# Patient Record
Sex: FEMALE | Race: WHITE | Employment: FULL TIME | URBAN - METROPOLITAN AREA
[De-identification: names, ages, dates, MRNs, and addresses within clinical notes are randomized per-mention and may not be internally consistent; named-entity substitution may affect disease eponyms.]

---

## 2018-10-23 ENCOUNTER — HOSPITAL ENCOUNTER (EMERGENCY)
Age: 28
Discharge: HOME | End: 2018-10-23
Attending: EMERGENCY MEDICINE
Payer: MEDICAID

## 2018-10-23 VITALS
HEART RATE: 83 BPM | BODY MASS INDEX: 31.14 KG/M2 | OXYGEN SATURATION: 100 % | DIASTOLIC BLOOD PRESSURE: 78 MMHG | TEMPERATURE: 98.4 F | RESPIRATION RATE: 14 BRPM | WEIGHT: 182.4 LBS | HEIGHT: 64 IN | SYSTOLIC BLOOD PRESSURE: 132 MMHG

## 2018-10-23 DIAGNOSIS — R11.2 NON-INTRACTABLE VOMITING WITH NAUSEA, UNSPECIFIED VOMITING TYPE: Primary | ICD-10-CM

## 2018-10-23 LAB
BILIRUBIN URINE: NEGATIVE
BLOOD, URINE: NEGATIVE
CLARITY: CLEAR
COLOR: YELLOW
GLUCOSE URINE: NEGATIVE MG/DL
HCG(URINE) PREGNANCY TEST: NEGATIVE
KETONES, URINE: NEGATIVE MG/DL
LEUKOCYTE ESTERASE, URINE: NEGATIVE
MICROSCOPIC EXAMINATION: NORMAL
NITRITE, URINE: NEGATIVE
PH UA: 7
PROTEIN UA: NEGATIVE MG/DL
SPECIFIC GRAVITY UA: 1.02
URINE REFLEX TO CULTURE: NORMAL
URINE TYPE: NORMAL
UROBILINOGEN, URINE: 0.2 E.U./DL

## 2018-10-23 PROCEDURE — 6360000002 HC RX W HCPCS: Performed by: EMERGENCY MEDICINE

## 2018-10-23 PROCEDURE — 81003 URINALYSIS AUTO W/O SCOPE: CPT

## 2018-10-23 PROCEDURE — 84703 CHORIONIC GONADOTROPIN ASSAY: CPT

## 2018-10-23 PROCEDURE — 99283 EMERGENCY DEPT VISIT LOW MDM: CPT

## 2018-10-23 RX ORDER — ONDANSETRON 4 MG/1
4 TABLET, FILM COATED ORAL EVERY 8 HOURS PRN
Qty: 20 TABLET | Refills: 0 | Status: SHIPPED | OUTPATIENT
Start: 2018-10-23 | End: 2019-02-19

## 2018-10-23 RX ORDER — DICYCLOMINE HYDROCHLORIDE 10 MG/1
10 CAPSULE ORAL
Qty: 30 CAPSULE | Refills: 0 | Status: SHIPPED | OUTPATIENT
Start: 2018-10-23 | End: 2019-02-19

## 2018-10-23 RX ORDER — DICYCLOMINE HYDROCHLORIDE 10 MG/1
10 CAPSULE ORAL ONCE
Status: DISCONTINUED | OUTPATIENT
Start: 2018-10-23 | End: 2018-10-23 | Stop reason: HOSPADM

## 2018-10-23 RX ORDER — ONDANSETRON 4 MG/1
4 TABLET, ORALLY DISINTEGRATING ORAL ONCE
Status: COMPLETED | OUTPATIENT
Start: 2018-10-23 | End: 2018-10-23

## 2018-10-23 RX ADMIN — ONDANSETRON 4 MG: 4 TABLET, ORALLY DISINTEGRATING ORAL at 20:42

## 2019-02-19 ENCOUNTER — HOSPITAL ENCOUNTER (EMERGENCY)
Age: 29
Discharge: HOME | End: 2019-02-19
Attending: EMERGENCY MEDICINE
Payer: MEDICAID

## 2019-02-19 VITALS
DIASTOLIC BLOOD PRESSURE: 92 MMHG | SYSTOLIC BLOOD PRESSURE: 145 MMHG | RESPIRATION RATE: 20 BRPM | WEIGHT: 167.99 LBS | HEART RATE: 100 BPM | OXYGEN SATURATION: 96 % | BODY MASS INDEX: 28.68 KG/M2 | TEMPERATURE: 99.4 F | HEIGHT: 64 IN

## 2019-02-19 DIAGNOSIS — J06.9 UPPER RESPIRATORY TRACT INFECTION, UNSPECIFIED TYPE: Primary | ICD-10-CM

## 2019-02-19 PROCEDURE — 99282 EMERGENCY DEPT VISIT SF MDM: CPT

## 2019-02-19 PROCEDURE — 6370000000 HC RX 637 (ALT 250 FOR IP): Performed by: EMERGENCY MEDICINE

## 2019-02-19 RX ORDER — PSEUDOEPHEDRINE HYDROCHLORIDE 30 MG/1
30 TABLET ORAL EVERY 4 HOURS PRN
Status: DISCONTINUED | OUTPATIENT
Start: 2019-02-19 | End: 2019-02-20 | Stop reason: HOSPADM

## 2019-02-19 RX ORDER — CLINDAMYCIN HYDROCHLORIDE 300 MG/1
300 CAPSULE ORAL
COMMUNITY
Start: 2019-01-13 | End: 2019-03-07

## 2019-02-19 RX ORDER — ECHINACEA PURPUREA EXTRACT 125 MG
2 TABLET ORAL PRN
Qty: 1 BOTTLE | Refills: 3 | Status: SHIPPED | OUTPATIENT
Start: 2019-02-19 | End: 2020-01-12 | Stop reason: ALTCHOICE

## 2019-02-19 RX ADMIN — PSEUDOEPHEDRINE HCL 30 MG: 30 TABLET, FILM COATED ORAL at 23:34

## 2019-02-19 ASSESSMENT — PAIN SCALES - GENERAL
PAINLEVEL_OUTOF10: 9
PAINLEVEL_OUTOF10: 9

## 2019-02-19 ASSESSMENT — PAIN - FUNCTIONAL ASSESSMENT: PAIN_FUNCTIONAL_ASSESSMENT: 0-10

## 2019-03-07 ENCOUNTER — HOSPITAL ENCOUNTER (EMERGENCY)
Age: 29
Discharge: HOME | End: 2019-03-07
Attending: EMERGENCY MEDICINE
Payer: MEDICAID

## 2019-03-07 ENCOUNTER — APPOINTMENT (OUTPATIENT)
Dept: GENERAL RADIOLOGY | Age: 29
End: 2019-03-07
Payer: MEDICAID

## 2019-03-07 VITALS
DIASTOLIC BLOOD PRESSURE: 79 MMHG | WEIGHT: 165.57 LBS | BODY MASS INDEX: 28.42 KG/M2 | TEMPERATURE: 98.3 F | SYSTOLIC BLOOD PRESSURE: 139 MMHG | OXYGEN SATURATION: 97 % | RESPIRATION RATE: 16 BRPM | HEART RATE: 90 BPM

## 2019-03-07 DIAGNOSIS — J02.0 STREP THROAT: Primary | ICD-10-CM

## 2019-03-07 DIAGNOSIS — J02.9 ACUTE PHARYNGITIS, UNSPECIFIED ETIOLOGY: ICD-10-CM

## 2019-03-07 DIAGNOSIS — J06.9 UPPER RESPIRATORY TRACT INFECTION, UNSPECIFIED TYPE: ICD-10-CM

## 2019-03-07 DIAGNOSIS — R05.9 COUGH: ICD-10-CM

## 2019-03-07 DIAGNOSIS — Z72.0 TOBACCO ABUSE: ICD-10-CM

## 2019-03-07 LAB
RAPID INFLUENZA  B AGN: NEGATIVE
RAPID INFLUENZA A AGN: NEGATIVE
S PYO AG THROAT QL: POSITIVE

## 2019-03-07 PROCEDURE — 6370000000 HC RX 637 (ALT 250 FOR IP): Performed by: NURSE PRACTITIONER

## 2019-03-07 PROCEDURE — 71046 X-RAY EXAM CHEST 2 VIEWS: CPT

## 2019-03-07 PROCEDURE — 99283 EMERGENCY DEPT VISIT LOW MDM: CPT

## 2019-03-07 PROCEDURE — 87804 INFLUENZA ASSAY W/OPTIC: CPT

## 2019-03-07 PROCEDURE — 87880 STREP A ASSAY W/OPTIC: CPT

## 2019-03-07 RX ORDER — AZITHROMYCIN 500 MG/1
500 TABLET, FILM COATED ORAL ONCE
Status: COMPLETED | OUTPATIENT
Start: 2019-03-07 | End: 2019-03-07

## 2019-03-07 RX ORDER — ACETAMINOPHEN 500 MG
1000 TABLET ORAL EVERY 6 HOURS PRN
Qty: 30 TABLET | Refills: 0 | Status: SHIPPED | OUTPATIENT
Start: 2019-03-07 | End: 2020-01-12 | Stop reason: ALTCHOICE

## 2019-03-07 RX ORDER — BENZONATATE 100 MG/1
100 CAPSULE ORAL 3 TIMES DAILY PRN
Qty: 20 CAPSULE | Refills: 0 | Status: SHIPPED | OUTPATIENT
Start: 2019-03-07 | End: 2020-01-12 | Stop reason: ALTCHOICE

## 2019-03-07 RX ORDER — FLUTICASONE PROPIONATE 50 MCG
1 SPRAY, SUSPENSION (ML) NASAL DAILY
Qty: 1 BOTTLE | Refills: 0 | Status: SHIPPED | OUTPATIENT
Start: 2019-03-07 | End: 2020-01-12 | Stop reason: ALTCHOICE

## 2019-03-07 RX ORDER — AZITHROMYCIN 500 MG/1
500 TABLET, FILM COATED ORAL DAILY
Qty: 5 TABLET | Refills: 0 | Status: SHIPPED | OUTPATIENT
Start: 2019-03-07 | End: 2019-03-12

## 2019-03-07 RX ADMIN — AZITHROMYCIN 500 MG: 500 TABLET, FILM COATED ORAL at 20:20

## 2019-03-07 ASSESSMENT — ENCOUNTER SYMPTOMS
TROUBLE SWALLOWING: 0
RESPIRATORY NEGATIVE: 1
SHORTNESS OF BREATH: 0
SORE THROAT: 1
VOICE CHANGE: 0
GASTROINTESTINAL NEGATIVE: 1
FACIAL SWELLING: 0

## 2019-03-07 ASSESSMENT — PAIN SCALES - GENERAL
PAINLEVEL_OUTOF10: 6

## 2019-03-26 LAB
BASOPHILS ABSOLUTE: 0 K/UL (ref 0–0.2)
BASOPHILS RELATIVE PERCENT: 0.4 %
EOSINOPHILS ABSOLUTE: 0.3 K/UL (ref 0–0.6)
EOSINOPHILS RELATIVE PERCENT: 2.5 %
FOLLICLE STIMULATING HORMONE: 5.9 MIU/ML
HCT VFR BLD CALC: 40.8 % (ref 36–48)
HEMOGLOBIN: 13.4 G/DL (ref 12–16)
LYMPHOCYTES ABSOLUTE: 2 K/UL (ref 1–5.1)
LYMPHOCYTES RELATIVE PERCENT: 16.1 %
MCH RBC QN AUTO: 29.5 PG (ref 26–34)
MCHC RBC AUTO-ENTMCNC: 32.8 G/DL (ref 31–36)
MCV RBC AUTO: 90.2 FL (ref 80–100)
MONOCYTES ABSOLUTE: 0.3 K/UL (ref 0–1.3)
MONOCYTES RELATIVE PERCENT: 2.7 %
NEUTROPHILS ABSOLUTE: 9.6 K/UL (ref 1.7–7.7)
NEUTROPHILS RELATIVE PERCENT: 78.3 %
PDW BLD-RTO: 14.5 % (ref 12.4–15.4)
PLATELET # BLD: 215 K/UL (ref 135–450)
PMV BLD AUTO: 9.9 FL (ref 5–10.5)
RBC # BLD: 4.53 M/UL (ref 4–5.2)
TSH SERPL DL<=0.05 MIU/L-ACNC: 0.75 UIU/ML (ref 0.27–4.2)
WBC # BLD: 12.3 K/UL (ref 4–11)

## 2020-01-12 ENCOUNTER — HOSPITAL ENCOUNTER (EMERGENCY)
Age: 30
Discharge: HOME | End: 2020-01-13
Attending: EMERGENCY MEDICINE

## 2020-01-12 LAB
A/G RATIO: 1.4 (ref 1.1–2.2)
ALBUMIN SERPL-MCNC: 4.1 G/DL (ref 3.4–5)
ALP BLD-CCNC: 72 U/L (ref 40–129)
ALT SERPL-CCNC: 14 U/L (ref 10–40)
AMORPHOUS: ABNORMAL /HPF
ANION GAP SERPL CALCULATED.3IONS-SCNC: 9 MMOL/L (ref 3–16)
AST SERPL-CCNC: 14 U/L (ref 15–37)
BACTERIA: ABNORMAL /HPF
BASOPHILS ABSOLUTE: 0 K/UL (ref 0–0.2)
BASOPHILS RELATIVE PERCENT: 0.6 %
BILIRUB SERPL-MCNC: <0.2 MG/DL (ref 0–1)
BILIRUBIN URINE: NEGATIVE
BLOOD, URINE: NEGATIVE
BUN BLDV-MCNC: 9 MG/DL (ref 7–20)
CALCIUM SERPL-MCNC: 9.8 MG/DL (ref 8.3–10.6)
CHLORIDE BLD-SCNC: 105 MMOL/L (ref 99–110)
CLARITY: ABNORMAL
CO2: 27 MMOL/L (ref 21–32)
COLOR: YELLOW
CREAT SERPL-MCNC: 0.6 MG/DL (ref 0.6–1.1)
EOSINOPHILS ABSOLUTE: 0.5 K/UL (ref 0–0.6)
EOSINOPHILS RELATIVE PERCENT: 6.5 %
EPITHELIAL CELLS, UA: ABNORMAL /HPF
GFR AFRICAN AMERICAN: >60
GFR NON-AFRICAN AMERICAN: >60
GLOBULIN: 2.9 G/DL
GLUCOSE BLD-MCNC: 82 MG/DL (ref 70–99)
GLUCOSE URINE: NEGATIVE MG/DL
HCG(URINE) PREGNANCY TEST: NEGATIVE
HCT VFR BLD CALC: 38.2 % (ref 36–48)
HEMOGLOBIN: 12.8 G/DL (ref 12–16)
KETONES, URINE: ABNORMAL MG/DL
LEUKOCYTE ESTERASE, URINE: ABNORMAL
LYMPHOCYTES ABSOLUTE: 2.4 K/UL (ref 1–5.1)
LYMPHOCYTES RELATIVE PERCENT: 29.9 %
MCH RBC QN AUTO: 31.5 PG (ref 26–34)
MCHC RBC AUTO-ENTMCNC: 33.6 G/DL (ref 31–36)
MCV RBC AUTO: 93.8 FL (ref 80–100)
MICROSCOPIC EXAMINATION: YES
MONOCYTES ABSOLUTE: 0.5 K/UL (ref 0–1.3)
MONOCYTES RELATIVE PERCENT: 5.9 %
NEUTROPHILS ABSOLUTE: 4.7 K/UL (ref 1.7–7.7)
NEUTROPHILS RELATIVE PERCENT: 57.1 %
NITRITE, URINE: NEGATIVE
PDW BLD-RTO: 12.9 % (ref 12.4–15.4)
PH UA: 7 (ref 5–8)
PLATELET # BLD: 160 K/UL (ref 135–450)
PMV BLD AUTO: 9.8 FL (ref 5–10.5)
POTASSIUM SERPL-SCNC: 3.9 MMOL/L (ref 3.5–5.1)
PROTEIN UA: NEGATIVE MG/DL
RBC # BLD: 4.08 M/UL (ref 4–5.2)
RBC UA: ABNORMAL /HPF (ref 0–2)
SODIUM BLD-SCNC: 141 MMOL/L (ref 136–145)
SPECIFIC GRAVITY UA: 1.02 (ref 1–1.03)
TOTAL PROTEIN: 7 G/DL (ref 6.4–8.2)
URINE REFLEX TO CULTURE: YES
URINE TYPE: ABNORMAL
UROBILINOGEN, URINE: 0.2 E.U./DL
WBC # BLD: 8.1 K/UL (ref 4–11)
WBC UA: ABNORMAL /HPF (ref 0–5)

## 2020-01-12 PROCEDURE — 36415 COLL VENOUS BLD VENIPUNCTURE: CPT

## 2020-01-12 PROCEDURE — 85025 COMPLETE CBC W/AUTO DIFF WBC: CPT

## 2020-01-12 PROCEDURE — 81001 URINALYSIS AUTO W/SCOPE: CPT

## 2020-01-12 PROCEDURE — 96374 THER/PROPH/DIAG INJ IV PUSH: CPT

## 2020-01-12 PROCEDURE — 99284 EMERGENCY DEPT VISIT MOD MDM: CPT

## 2020-01-12 PROCEDURE — 84703 CHORIONIC GONADOTROPIN ASSAY: CPT

## 2020-01-12 PROCEDURE — 80053 COMPREHEN METABOLIC PANEL: CPT

## 2020-01-12 PROCEDURE — 87086 URINE CULTURE/COLONY COUNT: CPT

## 2020-01-12 PROCEDURE — 6360000002 HC RX W HCPCS: Performed by: EMERGENCY MEDICINE

## 2020-01-12 PROCEDURE — 96361 HYDRATE IV INFUSION ADD-ON: CPT

## 2020-01-12 PROCEDURE — 2580000003 HC RX 258: Performed by: EMERGENCY MEDICINE

## 2020-01-12 RX ORDER — ONDANSETRON 2 MG/ML
4 INJECTION INTRAMUSCULAR; INTRAVENOUS ONCE
Status: COMPLETED | OUTPATIENT
Start: 2020-01-12 | End: 2020-01-12

## 2020-01-12 RX ORDER — 0.9 % SODIUM CHLORIDE 0.9 %
1000 INTRAVENOUS SOLUTION INTRAVENOUS ONCE
Status: COMPLETED | OUTPATIENT
Start: 2020-01-12 | End: 2020-01-13

## 2020-01-12 RX ADMIN — SODIUM CHLORIDE 1000 ML: 9 INJECTION, SOLUTION INTRAVENOUS at 23:35

## 2020-01-12 RX ADMIN — ONDANSETRON 4 MG: 2 INJECTION INTRAMUSCULAR; INTRAVENOUS at 23:35

## 2020-01-12 ASSESSMENT — PAIN DESCRIPTION - PAIN TYPE: TYPE: ACUTE PAIN

## 2020-01-12 ASSESSMENT — PAIN DESCRIPTION - LOCATION: LOCATION: ABDOMEN

## 2020-01-12 ASSESSMENT — PAIN SCALES - GENERAL: PAINLEVEL_OUTOF10: 5

## 2020-01-12 ASSESSMENT — PAIN DESCRIPTION - DESCRIPTORS: DESCRIPTORS: ACHING

## 2020-01-13 VITALS
SYSTOLIC BLOOD PRESSURE: 120 MMHG | HEART RATE: 74 BPM | TEMPERATURE: 98.3 F | WEIGHT: 150.13 LBS | OXYGEN SATURATION: 99 % | DIASTOLIC BLOOD PRESSURE: 74 MMHG | BODY MASS INDEX: 25.77 KG/M2 | RESPIRATION RATE: 16 BRPM

## 2020-01-13 RX ORDER — ONDANSETRON 4 MG/1
4 TABLET, ORALLY DISINTEGRATING ORAL EVERY 8 HOURS PRN
Qty: 10 TABLET | Refills: 0 | Status: SHIPPED | OUTPATIENT
Start: 2020-01-13 | End: 2020-02-19

## 2020-01-13 NOTE — ED PROVIDER NOTES
Triage Chief Complaint:   Emesis (started this am, \"tried weed to help with the nausea and that didn't help, usually does\" ) and Diarrhea (4 days ago, now [place] and feel constipated\" )      [place]:  Zhen Clarke is a 34 y.o. female that presents with nausea vomiting and diarrhea. Patient did not have a fever and has not passed blood in her vomitus or stool. She denies urinary symptoms and does not believe herself to be pregnant. Symptoms have been ongoing for the past 4 days and she does have a burning sensation in her epigastrium. She has had a  as her only abdominal surgery. Patient is generally healthy with no active medications at this time. ROS:  · Constitutional: No Fever or Weight Loss  · Eyes: No Decreased Vision  · ENT: No stridor, sore throat, congestion,    · Cardiovascular: No chest pain or palpitations   · Respiratory: No shortness of breath, cough, or wheezing  · Gastrointestinal: Positive abdominal pain, nausea, vomiting, and diarrhea  · Genitourinary: No dysuria, or flank pain  · Musculoskeletal:  No  weakness, no muscle or joint pain  · Integumentary: No rash, bruising, or pruritis      Past Medical History:   Diagnosis Date    Arthritis dx 2010    Arthritis     Chlamydia     chlamydia or gonorrhea at 14years old.  Depression     Eczema     Heart murmur     History of chlamydia     pt states she had either chlamydia or gonorrhea but not sure which one.  She was treated on antibiotics    History of gonorrhea     pt states she had either gonorrhea or chlamydia  and was treated on antibiotics    Latex allergy dx     allergy to latex condoms    Miscarriage     Seasonal allergies dx     Takes sudafed prn    Staph infection     Upper respiratory infection 2012    was on antibiotics and inhalor     Past Surgical History:   Procedure Laterality Date     SECTION       Family History   Problem Relation Age of Onset    Emphysema Mother    [facility] Other Mother arthritis/endometreosis    Rashes/Skin Problems Mother         eczema    High Cholesterol Mother     Stroke Mother     Diabetes Maternal Grandmother     Thyroid Disease Maternal Grandmother     Heart Disease Maternal Grandmother     Arthritis Maternal Grandmother     Asthma Maternal Grandmother      Social History     Socioeconomic History    Marital status: Single     Spouse name: Not on file    Number of children: Not on file    Years of education: Not on file    Highest education level: Not on file   Occupational History    Not on file   Social Needs    Financial resource strain: Not on file    Food insecurity:     Worry: Not on file     Inability: Not on file    Transportation needs:     Medical: Not on file     Non-medical: Not on file   Tobacco Use    Smoking status: Current Every Day Smoker     Packs/day: 0.50     Years: 10.00     Pack years: 5.00     Types: Cigarettes    Smokeless tobacco: Never Used    Tobacco comment: .     Substance and Sexual Activity    Alcohol use: Yes     Comment: occasional    Drug use: Yes     Frequency: 5.0 times per week     Types: Marijuana     Comment: occas    Sexual activity: Yes     Partners: Male   Lifestyle    Physical activity:     Days per week: Not on file     Minutes per session: Not on file    Stress: Not on file   Relationships    Social connections:     Talks on phone: Not on file     Gets together: Not on file     Attends Worship service: Not on file     Active member of club or organization: Not on file     Attends meetings of clubs or organizations: Not on file     Relationship status: Not on file    Intimate partner violence:     Fear of current or ex partner: Not on file     Emotionally abused: Not on file     Physically abused: Not on file     Forced sexual activity: Not on file   Other Topics Concern    Not on file   Social History Narrative    Not on file     Current Facility-Administered Medications   Medication Dose interpreted all of the currently available lab results from this visit (if applicable):  Results for orders placed or performed during the hospital encounter of 01/12/20   Urinalysis Reflex to Culture   Result Value Ref Range    Color, UA Yellow Straw/Yellow    Clarity, UA SL CLOUDY (A) Clear    Glucose, Ur Negative Negative mg/dL    Bilirubin Urine Negative Negative    Ketones, Urine TRACE (A) Negative mg/dL    Specific Gravity, UA 1.020 1.005 - 1.030    Blood, Urine Negative Negative    pH, UA 7.0 5.0 - 8.0    Protein, UA Negative Negative mg/dL    Urobilinogen, Urine 0.2 <2.0 E.U./dL    Nitrite, Urine Negative Negative    Leukocyte Esterase, Urine TRACE (A) Negative    Microscopic Examination YES     Urine Type NotGiven     Urine Reflex to Culture Yes    Pregnancy, Urine   Result Value Ref Range    HCG(Urine) Pregnancy Test Negative Detects HCG level >20 MIU/mL   Microscopic Urinalysis   Result Value Ref Range    WBC, UA 3-5 0 - 5 /HPF    RBC, UA 0-2 0 - 2 /HPF    Epi Cells 20-50 /HPF    Bacteria, UA Rare (A) /HPF    Amorphous, UA 1+ (A) /HPF   CBC Auto Differential   Result Value Ref Range    WBC 8.1 4.0 - 11.0 K/uL    RBC 4.08 4.00 - 5.20 M/uL    Hemoglobin 12.8 12.0 - 16.0 g/dL    Hematocrit 38.2 36.0 - 48.0 %    MCV 93.8 80.0 - 100.0 fL    MCH 31.5 26.0 - 34.0 pg    MCHC 33.6 31.0 - 36.0 g/dL    RDW 12.9 12.4 - 15.4 %    Platelets 764 476 - 472 K/uL    MPV 9.8 5.0 - 10.5 fL    Neutrophils % 57.1 %    Lymphocytes % 29.9 %    Monocytes % 5.9 %    Eosinophils % 6.5 %    Basophils % 0.6 %    Neutrophils Absolute 4.7 1.7 - 7.7 K/uL    Lymphocytes Absolute 2.4 1.0 - 5.1 K/uL    Monocytes Absolute 0.5 0.0 - 1.3 K/uL    Eosinophils Absolute 0.5 0.0 - 0.6 K/uL    Basophils Absolute 0.0 0.0 - 0.2 K/uL   Comprehensive Metabolic Panel   Result Value Ref Range    Sodium 141 136 - 145 mmol/L    Potassium 3.9 3.5 - 5.1 mmol/L    Chloride 105 99 - 110 mmol/L    CO2 27 21 - 32 mmol/L    Anion Gap 9 3 - 16    Glucose 82 70 - 99 mg/dL    BUN 9 7 - 20 mg/dL    CREATININE 0.6 0.6 - 1.1 mg/dL    GFR Non-African American >60 >60    GFR African American >60 >60    Calcium 9.8 8.3 - 10.6 mg/dL    Total Protein 7.0 6.4 - 8.2 g/dL    Alb 4.1 3.4 - 5.0 g/dL    Albumin/Globulin Ratio 1.4 1.1 - 2.2    Total Bilirubin <0.2 0.0 - 1.0 mg/dL    Alkaline Phosphatase 72 40 - 129 U/L    ALT 14 10 - 40 U/L    AST 14 (L) 15 - 37 U/L    Globulin 2.9 g/dL        Radiographs (if obtained):  [] Radiologist's Report Reviewed:  No orders to display       [] Discussed with Radiologist:     [] The following radiograph was interpreted by myself in the absence of a radiologist:     EKG (if obtained): (All EKG's are interpreted by myself in the absence of a cardiologist)      MDM:   Patient with nausea vomiting and diarrhea presents to the ER for evaluation. Her labs were not diagnostically abnormal her symptoms improved and she is now eating potato chips in the emergency department. She will be treated as an outpatient I have given her Zofran for nausea and vomiting and recommended probiotic yogurt for diarrhea. In the room if problems develop as evidenced by persistent vomiting fever  worsening pain or other concerning symptoms she is to return to the emergency department for further evaluation. Patient understands these instructions    Final Impression:  1. Nausea vomiting and diarrhea        Critical Care:       Disposition referral (if applicable):  [facility]  [phone]  Call in 1 day  For wound re-check      Disposition medications (if applicable):  New Prescriptions    ONDANSETRON (ZOFRAN ODT) 4 MG DISINTEGRATING TABLET    Take 1 tablet by mouth every 8 hours as needed for Nausea or Vomiting       Comment: Please note this report has been produced using speech recognition software and may contain errors related to that system including errors in grammar, punctuation, and spelling, as well as words and phrases that may be inappropriate.  If there are any questions or concerns please feel free to contact the dictating provider for clarification.       (Please note that portions of this note may have been completed with a voice recognition program. Efforts were made to edit the dictations but occasionally words are mis-transcribed.)    MD Alex Matos., MD  01/13/20 8613

## 2020-01-13 NOTE — ED NOTES
To room to give warm blanket, patient seen eating salt and pickle chips, states, \"always makes me feel better but not today\"     Patria Juarez, RN  01/13/20 9548

## 2020-01-13 NOTE — ED NOTES
Reviewed instructions with patient and family, both verb under, discharged home, accompanied by family     Brooke Kessler RN  01/13/20 6914

## 2020-01-14 LAB — URINE CULTURE, ROUTINE: NORMAL

## 2020-02-19 ENCOUNTER — HOSPITAL ENCOUNTER (EMERGENCY)
Age: 30
Discharge: HOME | End: 2020-02-19
Attending: EMERGENCY MEDICINE

## 2020-02-19 VITALS
BODY MASS INDEX: 24.69 KG/M2 | HEIGHT: 64 IN | WEIGHT: 144.62 LBS | DIASTOLIC BLOOD PRESSURE: 61 MMHG | OXYGEN SATURATION: 99 % | TEMPERATURE: 98.6 F | RESPIRATION RATE: 16 BRPM | SYSTOLIC BLOOD PRESSURE: 126 MMHG | HEART RATE: 86 BPM

## 2020-02-19 PROCEDURE — 99283 EMERGENCY DEPT VISIT LOW MDM: CPT

## 2020-02-19 RX ORDER — CLINDAMYCIN HYDROCHLORIDE 150 MG/1
150 CAPSULE ORAL 4 TIMES DAILY
Qty: 40 CAPSULE | Refills: 0 | Status: SHIPPED | OUTPATIENT
Start: 2020-02-19 | End: 2020-02-29

## 2020-02-19 RX ORDER — AMOXICILLIN AND CLAVULANATE POTASSIUM 875; 125 MG/1; MG/1
1 TABLET, FILM COATED ORAL 2 TIMES DAILY
Qty: 20 TABLET | Refills: 0 | Status: SHIPPED | OUTPATIENT
Start: 2020-02-19 | End: 2020-02-29

## 2020-02-19 RX ORDER — CLINDAMYCIN HYDROCHLORIDE 300 MG/1
300 CAPSULE ORAL 3 TIMES DAILY
Qty: 30 CAPSULE | Refills: 0 | Status: SHIPPED | OUTPATIENT
Start: 2020-02-19 | End: 2020-02-19 | Stop reason: SDUPTHER

## 2020-02-19 ASSESSMENT — PAIN DESCRIPTION - DESCRIPTORS: DESCRIPTORS: ACHING;SORE

## 2020-02-19 ASSESSMENT — ENCOUNTER SYMPTOMS
EYE DISCHARGE: 0
ABDOMINAL PAIN: 0
CHOKING: 0
VOICE CHANGE: 0
SORE THROAT: 1
SHORTNESS OF BREATH: 0
APNEA: 0
EYE ITCHING: 0
COUGH: 0
TROUBLE SWALLOWING: 0
CHEST TIGHTNESS: 0

## 2020-02-19 ASSESSMENT — PAIN SCALES - GENERAL
PAINLEVEL_OUTOF10: 10
PAINLEVEL_OUTOF10: 10

## 2020-02-19 ASSESSMENT — PAIN - FUNCTIONAL ASSESSMENT: PAIN_FUNCTIONAL_ASSESSMENT: 0-10

## 2020-02-19 ASSESSMENT — PAIN DESCRIPTION - LOCATION: LOCATION: THROAT

## 2020-02-19 ASSESSMENT — PAIN DESCRIPTION - PAIN TYPE: TYPE: ACUTE PAIN

## 2020-02-20 NOTE — ED PROVIDER NOTES
[address] [facility]  eMERGENCY dEPARTMENT eNCOUnter      Pt Name: Aayush Cota  MRN: [id number]  [place] 1990  Date of evaluation: 2/19/2020  Provider: Ernesto Cuellar MD    CHIEF COMPLAINT       Multiple complaints     HISTORY OF PRESENT ILLNESS    Aayush Cota is a 31 y.o. female who presents to the emergency department with multiple complaints. + for sore throat x 2 days. Pain is 9/10 sharp in nature. Hasn't taken anything for pain. Nothing makes symptoms better or worse. Denies GERALD or SOB. + for left ear pain. Times one day. After using q tip pain occurred. Pain is 9/10 sharp in nature. Hasn't taken anything for pain. Nothing makes symptoms better or worse. Left calf \"abscess. \" States she popped it 24 hours pta. + for redness now. Pain is 9/10 sharp in nature. Hasn't taken anything for pain. Nothing makes symptoms better or worse. No other associated symptoms. Hx MRSA. Nursing Notes were reviewed. REVIEW OF SYSTEMS       Review of Systems   Constitutional: Negative for activity change, appetite change, diaphoresis, fatigue and fever. HENT: Positive for ear pain and sore throat. Negative for tinnitus, trouble swallowing and voice change. Eyes: Negative for discharge and itching. Respiratory: Negative for apnea, cough, choking, chest tightness and shortness of breath. Cardiovascular: Negative for chest pain and leg swelling. Gastrointestinal: Negative for abdominal pain. Genitourinary: Negative for difficulty urinating. Musculoskeletal: Negative for arthralgias. Neurological: Negative for dizziness and facial asymmetry. Psychiatric/Behavioral: Negative for agitation. Except as noted above the remainder of the review of systems was reviewed and negative. PAST MEDICAL HISTORY     Past Medical History:   Diagnosis Date    Arthritis dx 2010    Arthritis     Chlamydia     chlamydia or gonorrhea at 14years old.     Depression     Eczema     Heart murmur     History of chlamydia     pt states she had either chlamydia or gonorrhea but not sure which one. She was treated on antibiotics    History of gonorrhea     pt states she had either gonorrhea or chlamydia  and was treated on antibiotics    Latex allergy dx     allergy to latex condoms    Miscarriage     Seasonal allergies dx     Takes sudafed prn    Staph infection     Upper respiratory infection 2012    was on antibiotics and inhalor       SURGICAL HISTORY       Past Surgical History:   Procedure Laterality Date     SECTION         CURRENT MEDICATIONS       Previous Medications    No medications on file       ALLERGIES     Latex; Vicodin [hydrocodone-acetaminophen]; Flexeril [cyclobenzaprine]; Bactrim; Cephalexin; and Penicillins    FAMILY HISTORY        Family History   Problem Relation Age of Onset   [facility] Emphysema Mother     Other Mother         arthritis/endometreosis    Rashes/Skin Problems Mother         eczema    High Cholesterol Mother     Stroke Mother     Diabetes Maternal Grandmother     Thyroid Disease Maternal Grandmother     Heart Disease Maternal Grandmother     Arthritis Maternal Grandmother     Asthma Maternal Grandmother        SOCIAL HISTORY       Social History     Socioeconomic History    Marital status: Single     Spouse name: Not on file    Number of children: Not on file    Years of education: Not on file    Highest education level: Not on file   Occupational History    Not on file   Social Needs    Financial resource strain: Not on file    Food insecurity:     Worry: Not on file     Inability: Not on file    Transportation needs:     Medical: Not on file     Non-medical: Not on file   Tobacco Use    Smoking status: Current Every Day Smoker     Packs/day: 0.50     Years: 10.00     Pack years: 5.00     Types: Cigarettes    Smokeless tobacco: Never Used    Tobacco comment: . Substance and Sexual Activity    Alcohol use:  Yes Bactrim allergy and Doxy allergy per patient. Vitals reassuring. Tolerating po. No GERALD or SOB. I discussed with patient the results of evaluation in the ED, diagnosis, care, and prognosis. The plan is to discharge to home. Patient is in agreement with plan and questions have been answered.      I also discussed with patient the reasons which may require a return visit and the importance of follow-up care. The patient is well-appearing, nontoxic, and improved at the time of discharge. Patient agrees to call to arrange follow-up care as directed. Patient understands to return immediately for worsening/change in symptoms. CRITICAL CARE TIME   Total Critical Caretime was 0 minutes, excluding separately reportable procedures. There was a high probability of clinically significant/life threatening deterioration in the patient's condition which required my urgent intervention. PROCEDURES:  Unlessotherwise noted below, none    FINAL IMPRESSION      1. Acute pharyngitis, unspecified etiology    2. Cellulitis, unspecified cellulitis site    3.  Perforation of left tympanic membrane          DISPOSITION/PLAN   DISPOSITION Decision To Discharge 02/19/2020 08:37:29 PM    PATIENT REFERRED TO:  Adelina Whyte MD  [address]  [place] [address] [facility]  [phone]            DISCHARGE MEDICATIONS:  New Prescriptions    AMOXICILLIN-CLAVULANATE (AUGMENTIN) 875-125 MG PER TABLET    Take 1 tablet by mouth 2 times daily for 10 days    CLINDAMYCIN (CLEOCIN) 150 MG CAPSULE    Take 1 capsule by mouth 4 times daily for 10 days          (Please note that portions ofthis note were completed with a voice recognition program.  Efforts were made to edit the dictations but occasionally words are mis-transcribed.)    Amina Clinton MD(electronically signed)  Attending Emergency Physician          Amina Clinton MD  02/19/20 5385

## 2020-06-21 ENCOUNTER — APPOINTMENT (OUTPATIENT)
Dept: GENERAL RADIOLOGY | Age: 30
End: 2020-06-21

## 2020-06-21 ENCOUNTER — HOSPITAL ENCOUNTER (EMERGENCY)
Age: 30
Discharge: HOME | End: 2020-06-22
Attending: EMERGENCY MEDICINE

## 2020-06-21 VITALS
HEIGHT: 64 IN | SYSTOLIC BLOOD PRESSURE: 137 MMHG | WEIGHT: 137.13 LBS | BODY MASS INDEX: 23.41 KG/M2 | HEART RATE: 78 BPM | OXYGEN SATURATION: 99 % | TEMPERATURE: 98.5 F | DIASTOLIC BLOOD PRESSURE: 87 MMHG

## 2020-06-21 LAB — HCG(URINE) PREGNANCY TEST: NEGATIVE

## 2020-06-21 PROCEDURE — 84703 CHORIONIC GONADOTROPIN ASSAY: CPT

## 2020-06-21 PROCEDURE — 99284 EMERGENCY DEPT VISIT MOD MDM: CPT

## 2020-06-21 PROCEDURE — 73562 X-RAY EXAM OF KNEE 3: CPT

## 2020-06-21 PROCEDURE — 73502 X-RAY EXAM HIP UNI 2-3 VIEWS: CPT

## 2020-06-21 RX ORDER — IBUPROFEN 200 MG
800 TABLET ORAL EVERY 6 HOURS PRN
COMMUNITY
End: 2021-07-15 | Stop reason: ALTCHOICE

## 2020-06-21 ASSESSMENT — PAIN DESCRIPTION - DESCRIPTORS
DESCRIPTORS: THROBBING;DULL;SHARP
DESCRIPTORS: ACHING;NUMBNESS

## 2020-06-21 ASSESSMENT — PAIN DESCRIPTION - PAIN TYPE
TYPE: ACUTE PAIN
TYPE: ACUTE PAIN

## 2020-06-21 ASSESSMENT — PAIN DESCRIPTION - FREQUENCY: FREQUENCY: CONTINUOUS

## 2020-06-21 ASSESSMENT — PAIN SCALES - GENERAL
PAINLEVEL_OUTOF10: 6
PAINLEVEL_OUTOF10: 6

## 2020-06-21 ASSESSMENT — PAIN DESCRIPTION - LOCATION
LOCATION: KNEE;HIP
LOCATION: KNEE;HIP

## 2020-06-21 ASSESSMENT — PAIN DESCRIPTION - ORIENTATION: ORIENTATION: LEFT;RIGHT

## 2020-06-22 RX ORDER — NAPROXEN 375 MG/1
375 TABLET ORAL 2 TIMES DAILY WITH MEALS
Qty: 60 TABLET | Refills: 0 | Status: SHIPPED | OUTPATIENT
Start: 2020-06-22

## 2020-06-22 ASSESSMENT — PAIN SCALES - GENERAL: PAINLEVEL_OUTOF10: 4

## 2020-06-22 ASSESSMENT — PAIN - FUNCTIONAL ASSESSMENT: PAIN_FUNCTIONAL_ASSESSMENT: 0-10

## 2021-07-15 ENCOUNTER — APPOINTMENT (OUTPATIENT)
Dept: GENERAL RADIOLOGY | Age: 31
End: 2021-07-15
Payer: MEDICAID

## 2021-07-15 ENCOUNTER — HOSPITAL ENCOUNTER (EMERGENCY)
Age: 31
Discharge: HOME | End: 2021-07-15
Payer: MEDICAID

## 2021-07-15 VITALS
WEIGHT: 147.05 LBS | TEMPERATURE: 98 F | HEART RATE: 77 BPM | RESPIRATION RATE: 18 BRPM | DIASTOLIC BLOOD PRESSURE: 48 MMHG | BODY MASS INDEX: 25.1 KG/M2 | HEIGHT: 64 IN | OXYGEN SATURATION: 100 % | SYSTOLIC BLOOD PRESSURE: 118 MMHG

## 2021-07-15 DIAGNOSIS — S62.317A CLOSED DISPLACED FRACTURE OF BASE OF FIFTH METACARPAL BONE OF LEFT HAND, INITIAL ENCOUNTER: Primary | ICD-10-CM

## 2021-07-15 PROCEDURE — 29125 APPL SHORT ARM SPLINT STATIC: CPT

## 2021-07-15 PROCEDURE — 99283 EMERGENCY DEPT VISIT LOW MDM: CPT

## 2021-07-15 PROCEDURE — 6370000000 HC RX 637 (ALT 250 FOR IP): Performed by: PHYSICIAN ASSISTANT

## 2021-07-15 PROCEDURE — 73130 X-RAY EXAM OF HAND: CPT

## 2021-07-15 RX ORDER — OXYCODONE HYDROCHLORIDE AND ACETAMINOPHEN 5; 325 MG/1; MG/1
1 TABLET ORAL EVERY 8 HOURS PRN
Qty: 8 TABLET | Refills: 0 | Status: SHIPPED | OUTPATIENT
Start: 2021-07-15 | End: 2021-07-18

## 2021-07-15 RX ORDER — IBUPROFEN 600 MG/1
600 TABLET ORAL ONCE
Status: COMPLETED | OUTPATIENT
Start: 2021-07-15 | End: 2021-07-15

## 2021-07-15 RX ORDER — IBUPROFEN 600 MG/1
600 TABLET ORAL EVERY 6 HOURS PRN
Qty: 30 TABLET | Refills: 0 | Status: SHIPPED | OUTPATIENT
Start: 2021-07-15 | End: 2022-07-28 | Stop reason: ALTCHOICE

## 2021-07-15 RX ADMIN — IBUPROFEN 600 MG: 600 TABLET, FILM COATED ORAL at 17:00

## 2021-07-15 ASSESSMENT — PAIN DESCRIPTION - LOCATION
LOCATION: HAND
LOCATION: HAND

## 2021-07-15 ASSESSMENT — PAIN SCALES - GENERAL
PAINLEVEL_OUTOF10: 8

## 2021-07-15 ASSESSMENT — ENCOUNTER SYMPTOMS
VOMITING: 0
COLOR CHANGE: 1
NAUSEA: 0

## 2021-07-15 ASSESSMENT — PAIN DESCRIPTION - DESCRIPTORS
DESCRIPTORS: ACHING
DESCRIPTORS: ACHING;TINGLING

## 2021-07-15 ASSESSMENT — PAIN DESCRIPTION - FREQUENCY
FREQUENCY: CONTINUOUS
FREQUENCY: CONTINUOUS

## 2021-07-15 ASSESSMENT — PAIN DESCRIPTION - ORIENTATION
ORIENTATION: LEFT
ORIENTATION: LEFT

## 2021-07-15 ASSESSMENT — PAIN DESCRIPTION - PROGRESSION: CLINICAL_PROGRESSION: GRADUALLY WORSENING

## 2021-07-15 ASSESSMENT — PAIN DESCRIPTION - PAIN TYPE
TYPE: ACUTE PAIN
TYPE: ACUTE PAIN

## 2021-07-15 NOTE — ED PROVIDER NOTES
[address] [facility] [address]  Dept: [phone]  Loc: [phone]  eMERGENCYdEPARTMENT eNCOUnter      Pt Name: Nelson Sol  MRN: [id number]  [place] 1990  Date of evaluation: 7/15/2021  Provider:Spring Cummings PA-C    CHIEF COMPLAINT       Chief Complaint   Patient presents with    Hand Injury     punched wall yesterday       CRITICAL CARE TIME   Total Critical Care time was 0 minutes, excluding separately reportable procedures. There was a high probability of clinically significant/life threatening deterioration in the patient's condition which required my urgentintervention. HISTORY OF PRESENT ILLNESS  (Location/Symptom, Timing/Onset, Context/Setting, Quality, Duration,Modifying Factors, Severity.)   Nelson Sol is a 36 y.o. female who presents to the emergency department by private vehicle complaining of left hand injury. Patient is concerned she sustained a boxer's fracture to her left hand after punching her back door yesterday. She punched her back door after she believed she was going to lose her puppies to parvo. Patient has some numbness in her fingertips, however can feel when she touches her hand. She has pain rated 8/10 to hand. All pain worsened with movement. She is left-hand dominant. Nursing Notes were reviewedand agreed with or any disagreements were addressed in the HPI. REVIEW OF SYSTEMS    (2-9 systems for level 4, 10 or more for level 5)     Review of Systems   Constitutional: Negative for chills and fever. HENT: Negative. Gastrointestinal: Negative for nausea and vomiting. Genitourinary: Negative. Musculoskeletal: Positive for arthralgias. Skin: Positive for color change. Negative for wound. Psychiatric/Behavioral: Negative for behavioral problems and confusion. Except as noted above the remainder of the review of systems was reviewed and negative.        PAST MEDICAL HISTORY         Diagnosis Date    Arthritis dx 2010    Arthritis     Chlamydia     chlamydia or gonorrhea at 14years old.  Depression     Eczema     Heart murmur     History of chlamydia     pt states she had either chlamydia or gonorrhea but not sure which one. She was treated on antibiotics    History of gonorrhea     pt states she had either gonorrhea or chlamydia  and was treated on antibiotics    Latex allergy dx     allergy to latex condoms    Miscarriage     Seasonal allergies dx     Takes sudafed prn    Staph infection     Upper respiratory infection 2012    was on antibiotics and inhalor       SURGICAL HISTORY           Procedure Laterality Date     SECTION         CURRENT MEDICATIONS     [unfilled]    ALLERGIES     Latex, Vicodin [hydrocodone-acetaminophen], Flexeril [cyclobenzaprine], Bactrim, Cephalexin, and Penicillins    FAMILY HISTORY           Problem Relation Age of Onset    Emphysema Mother     Other Mother         arthritis/endometreosis    Rashes/Skin Problems Mother         eczema    High Cholesterol Mother     Stroke Mother     Diabetes Maternal Grandmother     Thyroid Disease Maternal Grandmother     Heart Disease Maternal Grandmother     Arthritis Maternal Grandmother     Asthma Maternal Grandmother      Family Status   Relation Name Status    Mother  (Not Specified)    MGM  (Not Specified)        SOCIAL HISTORY      reports that she has been smoking cigarettes. She has a 5.00 pack-year smoking history. She has never used smokeless tobacco. She reports current alcohol use. She reports current drug use. Frequency: 5.00 times per week. Drug: Marijuana.     PHYSICAL EXAM    (up to 7 for level 4, 8 or more for level 5)     ED Triage Vitals [07/15/21 1541]   Enc Vitals Group      BP (!) 118/48      Pulse 77      Resp 18      Temp 98 °F (36.7 °C)      Temp Source Oral      SpO2 100 %      Weight 147 lb 0.8 oz (66.7 kg)      Height 5' 4\" (1.626 m)      Head Circumference       Peak Flow       Pain Score       Pain Loc       Pain Edu? Excl. in [address]? Physical Exam  Vitals reviewed. Constitutional:       Appearance: Normal appearance. HENT:      Head: Normocephalic and atraumatic. Pulmonary:      Effort: Pulmonary effort is normal. No respiratory distress. Musculoskeletal:      Comments: LUE: Generalized edema and ecchymosis to dorsal aspect of left hand overlying proximal portion of metacarpals 3 through 5 and into carpals. Generous tenderness palpation throughout this region. Minimal movement of digits 2-4 secondary to pain. Sensation present in fingertips, cap refill less than 3 seconds. Radial pulse +2. Skin:     General: Skin is warm. Neurological:      General: No focal deficit present. Mental Status: She is alert and oriented to person, place, and time. Psychiatric:         Mood and Affect: Mood normal.         Behavior: Behavior normal.           DIAGNOSTIC RESULTS     EKG: All EKG's are interpreted by the Emergency Department Physician who either signs or Co-signs this chart in the absence of a cardiologist.    RADIOLOGY:   Non-plain film images such as CT, Ultrasound and MRI are read by the radiologist. Plain radiographic images are visualized and preliminarilyinterpreted by the emergency physician with the below findings:    Interpretation per the Radiologist below,if available at the time of this note:    XR HAND LEFT (MIN 3 VIEWS)   Final Result   Acute, traumatic fracture of the 5th metacarpal base. Xray reviewed by myself, Vineet Roth (R), and showed acute minimally displaced fracture at the base of fifth metacarpal.      LABS:  Labs Reviewed - No data to display    All other labs were within normal range or not returned as of this dictation.     EMERGENCY DEPARTMENT COURSE and DIFFERENTIAL DIAGNOSIS/MDM:   Vitals:    Vitals:    07/15/21 1541   BP: (!) 118/48   Pulse: 77   Resp: 18 Temp: 98 °F (36.7 °C)   TempSrc: Oral   SpO2: 100%   Weight: 147 lb 0.8 oz (66.7 kg)   Height: 5' 4\" (1.626 m)       [facility]     Patient presents ED with HPI noted above. Physical exam as above. Patient with minimally to place intra-articular fracture at left fifth metacarpal base. X-ray reviewed by myself as above. Patient voices some numbness distally however sensation is present distally. Capillary refill less than 3 seconds. There is no open wounds. Patient placed in ulnar gutter. Patient to follow-up with hand specialist, she was provided contact information time discharge. She was given ibuprofen in the ED for pain inflammation. Will discharge home on ibuprofen. Did give a couple of Percocet for pain. Will only take if necessary. Patient with previous reaction Norco, has tolerated Percocet previously. Patient educated on splint use and precautions. She is discharged home in stable condition. The patient tolerated their visit well. I saw the patient independently with physician available for consultation as needed. I have discussed the findings of today's workup with the patient and addressed the patient's questions and concerns. Important warning signs as well as new or worsening symptoms which would necessitate immediate return to the ED were discussed. The plan is to discharge from the ED at this time, and the patient is in stable condition. The patient acknowledged understanding is agreeable with this plan. CONSULTS:  None    PROCEDURES:  Procedures     SPLINT PLACEMENT  A ulnary gutter splint was applied to the LUE by the emergency department technician. I evaluated the splint after it was applied. The splint is in good position. The patient's extremity is neurovascularly intact after the splint placement. FINAL IMPRESSION      1.  Closed displaced fracture of base of fifth metacarpal bone of left hand, initial encounter          DISPOSITION/PLAN   [unfilled]    PATIENT REFERRED TO:  [facility] [address]  [phone]  Go to   If symptoms worsen    Hemant Hurd MD  [address] [facility] [address] [place] [address]  [phone]    Call in 1 day  For follow up and reevaluation with hand specialist.      DISCHARGE MEDICATIONS:  New Prescriptions    IBUPROFEN (ADVIL;MOTRIN) 600 MG TABLET    Take 1 tablet by mouth every 6 hours as needed for Pain    OXYCODONE-ACETAMINOPHEN (PERCOCET) 5-325 MG PER TABLET    Take 1 tablet by mouth every 8 hours as needed for Pain for up to 3 days. WARNING:  May cause drowsiness. May impair ability to operate vehicles or machinery. Do not use in combination with alcohol.        (Please note that portions of this note were completed with a voice recognition program.  Efforts were made to edit the dictations but occasionally words are mis-transcribed.)    YVES Funes PA-C  07/15/21 [address] [place], [place]  07/15/21 6460

## 2021-07-20 ENCOUNTER — OFFICE VISIT (OUTPATIENT)
Dept: ORTHOPEDIC SURGERY | Age: 31
End: 2021-07-20
Payer: MEDICAID

## 2021-07-20 VITALS — RESPIRATION RATE: 16 BRPM | BODY MASS INDEX: 22.36 KG/M2 | WEIGHT: 131 LBS | HEIGHT: 64 IN

## 2021-07-20 DIAGNOSIS — S62.317A CLOSED DISPLACED FRACTURE OF BASE OF FIFTH METACARPAL BONE OF LEFT HAND, INITIAL ENCOUNTER: Primary | ICD-10-CM

## 2021-07-20 PROCEDURE — 99406 BEHAV CHNG SMOKING 3-10 MIN: CPT | Performed by: PHYSICIAN ASSISTANT

## 2021-07-20 PROCEDURE — 99203 OFFICE O/P NEW LOW 30 MIN: CPT | Performed by: PHYSICIAN ASSISTANT

## 2021-07-20 PROCEDURE — 26600 TREAT METACARPAL FRACTURE: CPT | Performed by: PHYSICIAN ASSISTANT

## 2021-07-20 NOTE — PROGRESS NOTES
Subjective:      Patient ID: Janet Odom is a 36 y.o.  female. Chief Complaint   Patient presents with    Hand Pain     left hand pain doi 7/14/21        HPI:  She is here for an initial evaluation of left hand pain. [facility] ER follow-up for left fifth metacarpal fracture. Onset of symptoms 7/14/2021. These symptoms have not been progressive in nature. There is  a history of injury. Punched a door. Pain is moderate. Location of pain-base of the fifth metacarpal.   Pain is worse with movement. Pain improves with ice and elevation. There is not associated numbness/ tingling. Previous treatments have included: Ice and Tylenol. Review of Systems:   A 14 point review of systems and history form completed by the patient has been reviewed. This form is scanned in the media tab of the patient's chart under today's date. Past Medical History:   Diagnosis Date    Arthritis dx 2010    Arthritis     Chlamydia     chlamydia or gonorrhea at 14years old.  Depression     Eczema     Heart murmur     History of chlamydia 2008    pt states she had either chlamydia or gonorrhea but not sure which one.  She was treated on antibiotics    History of gonorrhea 2008    pt states she had either gonorrhea or chlamydia  and was treated on antibiotics    Latex allergy dx 2008    allergy to latex condoms    Miscarriage     Seasonal allergies dx 2010    Takes sudafed prn    Staph infection     Upper respiratory infection 1/2012    was on antibiotics and inhalor       Family History   Problem Relation Age of Onset    Emphysema Mother     Other Mother         arthritis/endometreosis    Rashes/Skin Problems Mother         eczema    High Cholesterol Mother     Stroke Mother     Diabetes Maternal Grandmother     Thyroid Disease Maternal Grandmother     Heart Disease Maternal Grandmother     Arthritis Maternal Grandmother     Asthma Maternal Grandmother        Past Surgical History:   Procedure wood door   TECHNOLOGIST PROVIDED HISTORY:   Reason for exam:->punched wood door   Reason for Exam: PT. STATES YESTERDAY SHE PUNCHED A WALL C/O RADIATING PAIN   WITH SWELLING TO POSTERIOR LT. HAND   Acuity: Acute   Type of Exam: Initial   Mechanism of Injury: PUNCHED A WALL   Relevant Medical/Surgical History: NO HX OF LT. HAND PROBLEMS, NO HX OF   SURGERY TO LT. HAND       FINDINGS:   There is an acute, minimally displaced, intra-articular fracture of the 5th   metacarpal base.  Articular surface is mildly incongruent.  There is adjacent   soft tissue swelling.           Impression   Acute, traumatic fracture of the 5th metacarpal base.             Assessment:       ICD-10-CM    1. Closed displaced fracture of base of fifth metacarpal bone of left hand, initial encounter  S62.317A         Plan:         Assessment:  Minimally displaced intra-articular fracture of the base of the fifth metacarpal.    I had an extensive discussion with Ms. Constantino Myles and/or family regarding the natural history, etiology, and long term consequences of her condition. I have presented reasonable alternatives to the patient's proposed care, treatment, and services. Risks and benefits of the treatment options also reviewed in detail. I have outlined a treatment plan with them. She has had full opportunity to ask her questions. I have answered them all to her satisfaction. I feel that Ms. Constantino Myles understands our discussion today. I discussed both surgical and nonsurgical options. She has no interest and closed reduction and percutaneous pinning. She is opted to treat this in situ. The risk of delayed union, non union, mal union, the need for future surgical repair, continued pain were discussed with the patient in detail today. She understands some underlying medical conditions may also have an effect on bone healing.  She was instructed in proper nutrition,  proper immobilization and the importance to adhere to my recommendations. I did spend 5 minutes in the office today counseling patient on smoking cessation. Some of the benefits of quitting smoking, you may have a few reasons to be smoke-free. Your health may be one of them. · When you quit smoking, you lower your risks for cancer, lung disease, heart attack, stroke, blood vessel disease, and blindness from macular degeneration. · When you're smoke-free, you get sick less often, and you heal faster. You are less likely to get colds, flu, bronchitis, and pneumonia. · As a nonsmoker, you may find that your mood is better and you are less stressed. Plan:  Medications-I have discussed Tylenol for pain management of her left fifth metacarpal fracture. Procedures-Short arm cast was applied to the left upper extremity to include fourth and fifth digits. Follow up- 3 weeks. Obtain x-rays of her left hand out of the cast.  This likely will go into splint. Call or return to clinic if these symptoms worsen or fail to improve as anticipated. Tesfaye Gaviria PA-C   Senior Physician Assistant   [facility]                                         Disclaimer: This note was generated with use of a verbal recognition program (DRAGON) and an attempt was made to check for errors. It is possible that there are still dictated errors within this office note. If so, please bring any significant errors to my attention for an addendum. All efforts were made to ensure that this office note is accurate.

## 2021-08-02 ENCOUNTER — TELEPHONE (OUTPATIENT)
Dept: ORTHOPEDIC SURGERY | Age: 31
End: 2021-08-02

## 2021-08-02 NOTE — TELEPHONE ENCOUNTER
Called and offered patient to come in today to be evaluated and splinted. She declined appointment today. Made appointment for Thursday for follow up.

## 2021-08-02 NOTE — TELEPHONE ENCOUNTER
General Question     Subject: LT ARM CAST  Patient and /or Facility Request: [facility]  Contact Number: [phone]    PATIENT CAST GOT WET, AND SHE TOOK THE CAST OFF. PATIENT SCHEDULED TO BE SEEN Tuesday, AUGUST 8, 2021    PATIENT WOULD LIKE TO SPEAK TO SOMEONE ON WHAT SHE SHOULD DO. PLEASE CALL BACK PATIENT AT THE ABOVE NUMBER.

## 2021-08-05 ENCOUNTER — OFFICE VISIT (OUTPATIENT)
Dept: ORTHOPEDIC SURGERY | Age: 31
End: 2021-08-05

## 2021-08-05 VITALS — HEIGHT: 64 IN | BODY MASS INDEX: 23.05 KG/M2 | WEIGHT: 135 LBS | RESPIRATION RATE: 16 BRPM

## 2021-08-05 DIAGNOSIS — S62.317A CLOSED DISPLACED FRACTURE OF BASE OF FIFTH METACARPAL BONE OF LEFT HAND, INITIAL ENCOUNTER: Primary | ICD-10-CM

## 2021-08-05 PROCEDURE — 99024 POSTOP FOLLOW-UP VISIT: CPT | Performed by: PHYSICIAN ASSISTANT

## 2021-08-05 PROCEDURE — L3809 WHFO W/O JOINTS PRE OTS: HCPCS | Performed by: PHYSICIAN ASSISTANT

## 2021-08-07 NOTE — PROGRESS NOTES
Subjective:      Patient ID: Angelica Maurice is a 36 y.o.  female. Chief Complaint   Patient presents with    Follow-up     left hand fx        HPI:   She is here for follow up evaluation of left fifth metacarpal fracture. DOI 2021. Initially treated by me 2021 at that time was placed in a short arm cast.  She states on 2021 she removed the cast because it had become wet. She has been immobilized since that time. These symptoms are about the same or improved. Pain is worse with movement. Pain improves with ice and elevation. There is not associated numbness/ tingling. Review of Systems:   Negative for fever or chills. Past Medical History:   Diagnosis Date    Arthritis dx 2010    Arthritis     Chlamydia     chlamydia or gonorrhea at 14years old.  Depression     Eczema     Heart murmur     History of chlamydia     pt states she had either chlamydia or gonorrhea but not sure which one.  She was treated on antibiotics    History of gonorrhea     pt states she had either gonorrhea or chlamydia  and was treated on antibiotics    Latex allergy dx     allergy to latex condoms    Miscarriage     Seasonal allergies dx     Takes sudafed prn    Staph infection     Upper respiratory infection 2012    was on antibiotics and inhalor       Family History   Problem Relation Age of Onset    Emphysema Mother     Other Mother         arthritis/endometreosis    Rashes/Skin Problems Mother         eczema    High Cholesterol Mother     Stroke Mother     Diabetes Maternal Grandmother     Thyroid Disease Maternal Grandmother     Heart Disease Maternal Grandmother     Arthritis Maternal Grandmother     Asthma Maternal Grandmother        Past Surgical History:   Procedure Laterality Date     SECTION         Social History     Occupational History    Not on file   Tobacco Use    Smoking status: Current Every Day Smoker     Packs/day: 0.50     Years: 10.00     Pack years: 5.00     Types: Cigarettes    Smokeless tobacco: Never Used    Tobacco comment: [employer] Vaping Use    Vaping Use: Never used   Substance and Sexual Activity    Alcohol use: Yes     Comment: occasional    Drug use: Yes     Frequency: 5.0 times per week     Types: Marijuana     Comment: occas    Sexual activity: Yes     Partners: Male       Current Outpatient Medications   Medication Sig Dispense Refill    ibuprofen (ADVIL;MOTRIN) 600 MG tablet Take 1 tablet by mouth every 6 hours as needed for Pain 30 tablet 0    naproxen (NAPROSYN) 375 MG tablet Take 1 tablet by mouth 2 times daily (with meals) 60 tablet 0     No current facility-administered medications for this visit. Objective:     She is  oriented to person, place and time, pleasant, well nourished, developed and in no acute distress. Resp 16   Ht 5' 4\" (1.626 m)   Wt 135 lb (61.2 kg)   BMI 23.17 kg/m²      Left Wrist and Hand Exam:  The upper extremity is out of the cast.  There is mild swelling. Digital range of motion is limited by swelling and discomfort. Skin color, texture, turgor is normal.  No rashes or lesions found of the injured extremity. Vascular exam shows normal  And good capillary refill bilaterally. Sensation is subjectively normal in the whole hand. Digits are normally sensate. X Rays: performed in the office today:   AP, Lateral and Oblique of the Left Hand:  There is radiographic evidence of a healing fracture through the base of the fifth metacarpal. The alignment is minimally displaced. There is no dislocation. Assessment:       ICD-10-CM    1. Closed displaced fracture of base of fifth metacarpal bone of left hand, initial encounter  S62.317A XR HAND LEFT (MIN 3 VIEWS)     Ampar[place] TKO        Plan:       Assessment:  Healing base of the fifth metacarpal fracture left hand.   She has been immobilized since 7/31/2021 as she removed her own cast.  She will be placed in a TKO splint for continued immobilization. Plan:  Medications-Tylenol for pain. Follow up-4 weeks. Call or return to clinic if these symptoms worsen or fail to improve as anticipated.

## 2021-12-15 ENCOUNTER — HOSPITAL ENCOUNTER (EMERGENCY)
Age: 31
Discharge: LEFT BEFORE DISCHARGE | End: 2021-12-15

## 2021-12-15 VITALS
HEART RATE: 77 BPM | TEMPERATURE: 97.5 F | SYSTOLIC BLOOD PRESSURE: 126 MMHG | WEIGHT: 143 LBS | BODY MASS INDEX: 24.55 KG/M2 | DIASTOLIC BLOOD PRESSURE: 66 MMHG | OXYGEN SATURATION: 99 % | RESPIRATION RATE: 16 BRPM

## 2021-12-15 ASSESSMENT — PAIN SCALES - GENERAL: PAINLEVEL_OUTOF10: 9

## 2021-12-16 ENCOUNTER — APPOINTMENT (OUTPATIENT)
Dept: GENERAL RADIOLOGY | Age: 31
End: 2021-12-16
Payer: MEDICAID

## 2021-12-16 ENCOUNTER — HOSPITAL ENCOUNTER (EMERGENCY)
Age: 31
Discharge: HOME | End: 2021-12-16
Payer: MEDICAID

## 2021-12-16 VITALS
SYSTOLIC BLOOD PRESSURE: 132 MMHG | HEART RATE: 90 BPM | DIASTOLIC BLOOD PRESSURE: 74 MMHG | BODY MASS INDEX: 24.2 KG/M2 | WEIGHT: 141.76 LBS | OXYGEN SATURATION: 98 % | TEMPERATURE: 97.7 F | RESPIRATION RATE: 20 BRPM | HEIGHT: 64 IN

## 2021-12-16 DIAGNOSIS — S62.306A CLOSED NONDISPLACED FRACTURE OF FIFTH METACARPAL BONE OF RIGHT HAND, UNSPECIFIED PORTION OF METACARPAL, INITIAL ENCOUNTER: Primary | ICD-10-CM

## 2021-12-16 PROCEDURE — 99283 EMERGENCY DEPT VISIT LOW MDM: CPT

## 2021-12-16 PROCEDURE — 73130 X-RAY EXAM OF HAND: CPT

## 2021-12-16 RX ORDER — IBUPROFEN 800 MG/1
800 TABLET ORAL EVERY 8 HOURS PRN
Qty: 30 TABLET | Refills: 0 | Status: SHIPPED | OUTPATIENT
Start: 2021-12-16 | End: 2022-07-28 | Stop reason: ALTCHOICE

## 2021-12-16 ASSESSMENT — ENCOUNTER SYMPTOMS
BACK PAIN: 0
EYE PAIN: 0
ABDOMINAL PAIN: 0
SORE THROAT: 0
SHORTNESS OF BREATH: 0
NAUSEA: 0
VOMITING: 0
COUGH: 0

## 2021-12-16 ASSESSMENT — PAIN DESCRIPTION - ORIENTATION
ORIENTATION: ANTERIOR;RIGHT
ORIENTATION: RIGHT

## 2021-12-16 ASSESSMENT — PAIN DESCRIPTION - LOCATION
LOCATION: HAND
LOCATION: HAND

## 2021-12-16 ASSESSMENT — PAIN SCALES - GENERAL
PAINLEVEL_OUTOF10: 3
PAINLEVEL_OUTOF10: 6

## 2021-12-16 ASSESSMENT — PAIN DESCRIPTION - FREQUENCY: FREQUENCY: INTERMITTENT

## 2021-12-16 NOTE — ED NOTES
AVS reviewed with patient. Verbalized understanding. AVS was printed and given to patient. Printed prescription given to patient.      Peter Martel RN  12/16/21 [id number]

## 2021-12-16 NOTE — ED PROVIDER NOTES
12/16/21 1202   BP: 132/74   Pulse: 90   Resp: 20   Temp: 97.7 °F (36.5 °C)   TempSrc: Oral   SpO2: 98%   Weight: 141 lb 12.1 oz (64.3 kg)   Height: 5' 4\" (1.626 m)       LABS:Labs Reviewed - No data to display     Remainder of labs reviewed and were negative at this time or not returned at the time of this note. RADIOLOGY:   Non-plain film images such as CT, Ultrasound and MRI are read by the radiologist. Maureen Pryor PA-C have directly visualized the radiologic plain film image(s) with the below findings:      Interpretation per the Radiologist below, if available at the time of this note:    XR HAND RIGHT (MIN 3 VIEWS)   Preliminary Result   Acute, nondisplaced obliquely oriented intra-articular fracture involving the   distal aspect of the 5th metacarpal.              No results found. MEDICAL DECISION MAKING / ED COURSE:      PROCEDURES:   Procedures    None    Patient was given:  Medications - No data to display    Emergency room course: Patient on exam right upper extremity shoulder was nontender elbow nontender with full range of motion. The wrist show no tenderness with full flexion extension full ulnar radial deviation. She has mild tenderness over the fifth metacarpal and proximal and middle phalanx of the fifth finger. She did have full range of motion all digits MCP PIP and DIP joints. Capillary refill less than 2 seconds all digit radial pulse good at 2+. No obvious deformity noted. Slight swelling noted of the fifth finger. X-ray shows fifth metacarpal fracture. Patient will be placed in a ulnar gutter splint. Follow-up orthopedics. She will advised ice area 3-4 times a day. Do not get splint wet. I will give her naproxen for pain. She will be discharged stable condition. I did discuss patient x-ray results with her. The patient tolerated their visit well. I evaluated the patient. The physician was available for consultation as needed.   The patient and / or the family were informed of the results of any tests, a time was given to answer questions, a plan was proposed and they agreed with plan. CLINICAL IMPRESSION:  1.  Closed nondisplaced fracture of fifth metacarpal bone of right hand, unspecified portion of metacarpal, initial encounter        DISPOSITION  DISPOSITION Decision To Discharge 12/16/2021 12:52:18 PM        PATIENT REFERRED TO:  Karlene Monaco MD  [address] [place] [address]  [phone]    Call in 1 day        DISCHARGE MEDICATIONS:  New Prescriptions    IBUPROFEN (ADVIL;MOTRIN) 800 MG TABLET    Take 1 tablet by mouth every 8 hours as needed for Pain       DISCONTINUED MEDICATIONS:  Discontinued Medications    No medications on file              (Please note the MDM and HPI sections of this note were completed with a voice recognition program.  Efforts were made to edit the dictations but occasionally words are mis-transcribed.)    Electronically signed, Yasmeen Purcell PA-C,           Yasmeen Purcell PA-C  12/16/21 [id number]

## 2022-07-28 ENCOUNTER — APPOINTMENT (OUTPATIENT)
Dept: GENERAL RADIOLOGY | Age: 32
End: 2022-07-28
Payer: MEDICAID

## 2022-07-28 ENCOUNTER — HOSPITAL ENCOUNTER (EMERGENCY)
Age: 32
Discharge: HOME | End: 2022-07-28
Payer: MEDICAID

## 2022-07-28 VITALS
HEART RATE: 67 BPM | TEMPERATURE: 97.7 F | SYSTOLIC BLOOD PRESSURE: 119 MMHG | OXYGEN SATURATION: 99 % | HEIGHT: 64 IN | RESPIRATION RATE: 14 BRPM | DIASTOLIC BLOOD PRESSURE: 74 MMHG | WEIGHT: 134 LBS | BODY MASS INDEX: 22.88 KG/M2

## 2022-07-28 DIAGNOSIS — R20.2 PARESTHESIA OF BOTH HANDS: ICD-10-CM

## 2022-07-28 DIAGNOSIS — M25.562 ACUTE PAIN OF LEFT KNEE: Primary | ICD-10-CM

## 2022-07-28 LAB
A/G RATIO: 1.8 (ref 1.1–2.2)
ALBUMIN SERPL-MCNC: 4.5 G/DL (ref 3.4–5)
ALP BLD-CCNC: 56 U/L (ref 40–129)
ALT SERPL-CCNC: 17 U/L (ref 10–40)
ANION GAP SERPL CALCULATED.3IONS-SCNC: 11 MMOL/L (ref 3–16)
AST SERPL-CCNC: 16 U/L (ref 15–37)
BASOPHILS ABSOLUTE: 0 K/UL (ref 0–0.2)
BASOPHILS RELATIVE PERCENT: 0.5 %
BILIRUB SERPL-MCNC: 0.5 MG/DL (ref 0–1)
BUN BLDV-MCNC: 6 MG/DL (ref 7–20)
CALCIUM SERPL-MCNC: 9.1 MG/DL (ref 8.3–10.6)
CHLORIDE BLD-SCNC: 104 MMOL/L (ref 99–110)
CO2: 23 MMOL/L (ref 21–32)
CREAT SERPL-MCNC: <0.5 MG/DL (ref 0.6–1.1)
EOSINOPHILS ABSOLUTE: 0.5 K/UL (ref 0–0.6)
EOSINOPHILS RELATIVE PERCENT: 7.9 %
GFR AFRICAN AMERICAN: >60
GFR NON-AFRICAN AMERICAN: >60
GLUCOSE BLD-MCNC: 90 MG/DL (ref 70–99)
HCT VFR BLD CALC: 37.7 % (ref 36–48)
HEMOGLOBIN: 12.7 G/DL (ref 12–16)
LYMPHOCYTES ABSOLUTE: 2 K/UL (ref 1–5.1)
LYMPHOCYTES RELATIVE PERCENT: 31.3 %
MAGNESIUM: 1.9 MG/DL (ref 1.8–2.4)
MCH RBC QN AUTO: 31.3 PG (ref 26–34)
MCHC RBC AUTO-ENTMCNC: 33.8 G/DL (ref 31–36)
MCV RBC AUTO: 92.5 FL (ref 80–100)
MONOCYTES ABSOLUTE: 0.4 K/UL (ref 0–1.3)
MONOCYTES RELATIVE PERCENT: 5.9 %
NEUTROPHILS ABSOLUTE: 3.5 K/UL (ref 1.7–7.7)
NEUTROPHILS RELATIVE PERCENT: 54.4 %
PDW BLD-RTO: 13 % (ref 12.4–15.4)
PLATELET # BLD: 182 K/UL (ref 135–450)
PMV BLD AUTO: 9 FL (ref 5–10.5)
POTASSIUM SERPL-SCNC: 3.8 MMOL/L (ref 3.5–5.1)
RBC # BLD: 4.07 M/UL (ref 4–5.2)
SODIUM BLD-SCNC: 138 MMOL/L (ref 136–145)
TOTAL PROTEIN: 7 G/DL (ref 6.4–8.2)
WBC # BLD: 6.5 K/UL (ref 4–11)

## 2022-07-28 PROCEDURE — 99284 EMERGENCY DEPT VISIT MOD MDM: CPT

## 2022-07-28 PROCEDURE — 83735 ASSAY OF MAGNESIUM: CPT

## 2022-07-28 PROCEDURE — 80053 COMPREHEN METABOLIC PANEL: CPT

## 2022-07-28 PROCEDURE — 73564 X-RAY EXAM KNEE 4 OR MORE: CPT

## 2022-07-28 PROCEDURE — 85025 COMPLETE CBC W/AUTO DIFF WBC: CPT

## 2022-07-28 RX ORDER — IBUPROFEN 400 MG/1
400 TABLET ORAL EVERY 8 HOURS PRN
COMMUNITY

## 2022-07-28 RX ORDER — NAPROXEN 500 MG/1
500 TABLET ORAL 2 TIMES DAILY
Qty: 20 TABLET | Refills: 0 | Status: SHIPPED | OUTPATIENT
Start: 2022-07-28 | End: 2022-08-07

## 2022-07-28 RX ORDER — ALBUTEROL SULFATE 90 UG/1
AEROSOL, METERED RESPIRATORY (INHALATION)
COMMUNITY
Start: 2021-12-07

## 2022-07-28 RX ORDER — ACETAMINOPHEN 500 MG
TABLET ORAL
COMMUNITY
Start: 2022-07-14

## 2022-07-28 RX ORDER — LIDOCAINE 50 MG/G
1 PATCH TOPICAL DAILY
Qty: 30 PATCH | Refills: 0 | Status: SHIPPED | OUTPATIENT
Start: 2022-07-28

## 2022-07-28 ASSESSMENT — PAIN DESCRIPTION - LOCATION: LOCATION: KNEE

## 2022-07-28 ASSESSMENT — PAIN DESCRIPTION - ORIENTATION: ORIENTATION: LEFT

## 2022-07-28 ASSESSMENT — PAIN SCALES - GENERAL: PAINLEVEL_OUTOF10: 7

## 2022-07-28 ASSESSMENT — PAIN - FUNCTIONAL ASSESSMENT: PAIN_FUNCTIONAL_ASSESSMENT: 0-10

## 2022-07-28 NOTE — ED PROVIDER NOTES
[facility]        Pt Name: Gabo Herrera  MRN: [id number]  [place] 1990  Date of evaluation: 7/28/2022  Provider: Reji Kilgore PA-C  PCP: No primary care provider on file. Note Started: 2:27 PM EDT       NIKI. I have evaluated this patient. My supervising physician was available for consultation. CHIEF COMPLAINT       Chief Complaint   Patient presents with    Knee Pain     L knee pain. Pt verbalizes stepping in a hole and twisted knee at approximately 1800 yesterday. States that she was ambulating up stairs today and her knee gave out. Posterior knee pain. Numbness     Bilateral hand numbness and tingling. States that since Sunday night has been waking up daily with numbness in both hands. Pt states that she dropped a box at work this AM.       HISTORY OF PRESENT ILLNESS   (Location, Timing/Onset, Context/Setting, Quality, Duration, Modifying Factors, Severity, Associated Signs and Symptoms)  Note limiting factors. Chief Complaint: Left knee pain, bilateral hand tingling and numbness    Gabo Herrera is a 29 y.o. female who presents to the emergency department with a chief complaints of of left knee pain. She states she was running playing with her dog yesterday when she stepped in a hole and twisted her left knee. Denies much pain just sitting there but states anytime she bears weight or tries to move her knee she is having pain. Denies any previous history of injuries or surgeries to her left knee, wound, numbness. Secondarily for the past 4 days she woke up with some pain and has been having some intermittent pain, tingling and numbness in her bilateral hands and all of her digits. She states this comes and goes and typically only happens when she is doing things. 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   Denies decreased range of motion, recent injury or trauma, rash, color change, nausea, vomiting, fevers. Rates her pain a 7 out of 10. Nursing Notes were all reviewed and agreed with or any disagreements were addressed in the HPI. REVIEW OF SYSTEMS    (2-9 systems for level 4, 10 or more for level 5)     Review of Systems    Positives and Pertinent negatives as per HPI. Except as noted above in the ROS, all other systems were reviewed and negative. PAST MEDICAL HISTORY     Past Medical History:   Diagnosis Date    Arthritis dx 2010    Arthritis     Chlamydia     chlamydia or gonorrhea at 14years old. Depression     Eczema     Heart murmur     History of chlamydia     pt states she had either chlamydia or gonorrhea but not sure which one. She was treated on antibiotics    History of gonorrhea     pt states she had either gonorrhea or chlamydia  and was treated on antibiotics    Latex allergy dx     allergy to latex condoms    Miscarriage     Seasonal allergies dx     Takes sudafed prn    Staph infection     Upper respiratory infection 2012    was on antibiotics and inhalor         SURGICAL HISTORY     Past Surgical History:   Procedure Laterality Date     SECTION           [address]       Discharge Medication List as of 2022  2:49 PM        CONTINUE these medications which have NOT CHANGED    Details   albuterol sulfate HFA (PROVENTIL;VENTOLIN;PROAIR) 108 (90 Base) MCG/ACT inhaler albuterol sulfate HFA 90 mcg/actuation aerosol inhalerHistorical Med      acetaminophen (TYLENOL) 500 MG tablet Historical Med      ibuprofen (ADVIL;MOTRIN) 400 MG tablet Take 400 mg by mouth every 8 hours as neededHistorical Med      !! naproxen (NAPROSYN) 375 MG tablet Take 1 tablet by mouth 2 times daily (with meals), Disp-60 tablet, R-0Print       !! - Potential duplicate medications found. Please discuss with provider.             ALLERGIES     Latex, Vicodin [hydrocodone-acetaminophen], Flexeril [cyclobenzaprine], Bactrim, Cephalexin, and Penicillins    FAMILYHISTORY       Family History   Problem Relation Age of Onset    Emphysema Mother     Other Mother         arthritis/endometreosis    Rashes/Skin Problems Mother         eczema    High Cholesterol Mother     Stroke Mother     Diabetes Maternal Grandmother     Thyroid Disease Maternal Grandmother     Heart Disease Maternal Grandmother     Arthritis Maternal Grandmother     Asthma Maternal Grandmother           SOCIAL HISTORY       Social History     Tobacco Use    Smoking status: Every Day     Packs/day: 0.50     Years: 10.00     Pack years: 5.00     Types: Cigarettes    Smokeless tobacco: Never    Tobacco comments: [employer] [place] Vaping Use    Vaping Use: Never used   Substance Use Topics    Alcohol use: Yes     Comment: occasional    Drug use: Not Currently     Frequency: 5.0 times per week     Types: Marijuana Delona Members)     Comment: Stopped smoking marijuana 4-5 weeks ago       SCREENINGS    Germaine Coma Scale  Eye Opening: Spontaneous  Best Verbal Response: Oriented  Best Motor Response: Obeys commands  Germaine Coma Scale Score: 15        PHYSICAL EXAM    (up to 7 for level 4, 8 or more for level 5)     ED Triage Vitals [07/28/22 1241]   BP Temp Temp Source Heart Rate Resp SpO2 Height Weight   128/79 97.7 °F (36.5 °C) Oral 90 14 99 % 5' 4\" (1.626 m) 134 lb (60.8 kg)       Physical Exam  Vitals and nursing note reviewed. Constitutional:       Appearance: She is well-developed. She is not diaphoretic. HENT:      Head: Atraumatic. Nose: Nose normal.   Eyes:      General:         Right eye: No discharge. Left eye: No discharge. Cardiovascular:      Pulses: Normal pulses. Comments: 2+ dorsal pedal pulse in left foot. 2+ radial pulse bilaterally  Musculoskeletal:         General: Tenderness present. Cervical back: Normal range of motion. Comments: Some tenderness to palpate over the anterior lateral left knee without joint warmth, erythema or rash.   Full range of motion flexion extension of the left hip, knee, ankle. Negative Lachman test.  Negative anterior posterior drawer. Patient has full range of motion of bilateral hands, wrist and elbows. 5 out of 5 strength in bilateral upper extremities. Equivocal Phalen and Tinel sign positive. Skin:     General: Skin is warm and dry. Findings: No erythema or rash. Neurological:      Mental Status: She is alert. Psychiatric:         Behavior: Behavior normal.       DIAGNOSTIC RESULTS   LABS:    Labs Reviewed   COMPREHENSIVE METABOLIC PANEL - Abnormal; Notable for the following components:       Result Value    BUN 6 (*)     Creatinine <0.5 (*)     All other components within normal limits   CBC WITH AUTO DIFFERENTIAL   MAGNESIUM       When ordered only abnormal lab results are displayed. All other labs were within normal range or not returned as of this dictation. EKG: When ordered, EKG's are interpreted by the Emergency Department Physician in the absence of a cardiologist.  Please see their note for interpretation of EKG. RADIOLOGY:   Non-plain film images such as CT, Ultrasound and MRI are read by the radiologist. Plain radiographic images are visualized and preliminarily interpreted by the ED Provider with the below findings:        Interpretation per the Radiologist below, if available at the time of this note:    XR KNEE LEFT (MIN 4 VIEWS)   Final Result   No acute abnormality           XR KNEE LEFT (MIN 4 VIEWS)    Result Date: 7/28/2022  EXAMINATION: FOUR XRAY VIEWS OF THE LEFT KNEE 7/28/2022 1:14 pm COMPARISON: 06/22/2020 HISTORY: ORDERING SYSTEM PROVIDED HISTORY: Injury TECHNOLOGIST PROVIDED HISTORY: Reason for exam:->Injury Reason for Exam: Knee Pain (L knee pain. Pt verbalizes stepping in a hole and twisted knee at approximately 1800 yesterday. States that she was ambulating up stairs today and her knee gave out. Posterior knee pain.) FINDINGS: The soft tissues are unremarkable.   There is no fracture or dislocation. No focal destructive osseous lesion. No radiopaque foreign body is identified. There is no joint effusion. No acute abnormality           PROCEDURES   Unless otherwise noted below, none     Procedures    CRITICAL CARE TIME       CONSULTS:  None      EMERGENCY DEPARTMENT COURSE and DIFFERENTIAL DIAGNOSIS/MDM:   Vitals:    Vitals:    07/28/22 1241 07/28/22 1453   BP: 128/79 119/74   Pulse: 90 67   Resp: 14 14   Temp: 97.7 °F (36.5 °C)    TempSrc: Oral    SpO2: 99% 99%   Weight: 134 lb (60.8 kg)    Height: 5' 4\" (1.626 m)        Patient was given the following medications:  Medications - No data to display      Is this patient to be included in the SEP-1 Core Measure due to severe sepsis or septic shock? No   Exclusion criteria - the patient is NOT to be included for SEP-1 Core Measure due to: Infection is not suspected    Patient presented with some left knee pain after an injury yesterday. No ligamental laxity she has full range of motion x-ray imaging is unremarkable. Suspect strain. Low suspicion for acute fracture, septic arthritis, cellulitis or other emergent etiology. She was given crutches and Ace wrap. Also complains of some intermittent tingling and paresthesias and pain in both hands for the past 4 days. Has an equivocal Phalen and Tinel sign. Has full range of motion and sensation intact here. No electrolyte abnormalities. She will be referred to orthopedics anyways for the knee pain. Low suspicion for arterial occlusion, DVT, acute fractures in her hands or other emergent etiology. She was stable time of discharge. FINAL IMPRESSION      1. Acute pain of left knee    2.  Paresthesia of both hands          DISPOSITION/PLAN   DISPOSITION Decision To Discharge 07/28/2022 02:43:30 PM      PATIENT REFERRED TO:  Mabel Ardon MD  [address] [facility]  [phone]    Schedule an appointment as soon as possible for a visit   For re-check 3-5 days, For re-check    [facility] Emergency Department  [address] [place] [address]  [phone]    As needed    DISCHARGE MEDICATIONS:  Discharge Medication List as of 7/28/2022  2:49 PM        START taking these medications    Details   !! naproxen (NAPROSYN) 500 MG tablet Take 1 tablet by mouth in the morning and 1 tablet before bedtime. Do all this for 20 doses. , Disp-20 tablet, R-0Normal      lidocaine (LIDODERM) 5 % Place 1 patch onto the skin in the morning. 12 hours on, 12 hours off. ., Disp-30 patch, R-0Normal       !! - Potential duplicate medications found. Please discuss with provider.           DISCONTINUED MEDICATIONS:  Discharge Medication List as of 7/28/2022  2:49 PM                 (Please note that portions of this note were completed with a voice recognition program.  Efforts were made to edit the dictations but occasionally words are mis-transcribed.)    Fran Pryor PA-C (electronically signed)           Fran Pryor PA-C  07/28/22 3561

## 2022-07-28 NOTE — ED NOTES
Pt provided with ace-wrap and crutches. Pt educated on use and care of both. Pt verbalizes understanding and provides return demonstration of both ace-wrap application and crutch use.      Edilberto Meneses RN  07/28/22 9381

## 2022-07-28 NOTE — Clinical Note
Yuri Bang was seen and treated in our emergency department on 7/28/2022. She may return to work on 07/29/2022. If you have any questions or concerns, please don't hesitate to call.       Farhana Smith PA-C

## 2022-08-10 ENCOUNTER — HOSPITAL ENCOUNTER (EMERGENCY)
Age: 32
Discharge: HOME | End: 2022-08-10
Attending: EMERGENCY MEDICINE
Payer: MEDICAID

## 2022-08-10 VITALS
HEART RATE: 65 BPM | TEMPERATURE: 98.5 F | SYSTOLIC BLOOD PRESSURE: 116 MMHG | DIASTOLIC BLOOD PRESSURE: 66 MMHG | RESPIRATION RATE: 16 BRPM | OXYGEN SATURATION: 100 %

## 2022-08-10 DIAGNOSIS — K08.89 PAIN, DENTAL: Primary | ICD-10-CM

## 2022-08-10 PROCEDURE — 99283 EMERGENCY DEPT VISIT LOW MDM: CPT

## 2022-08-10 PROCEDURE — 6370000000 HC RX 637 (ALT 250 FOR IP): Performed by: EMERGENCY MEDICINE

## 2022-08-10 RX ORDER — CLINDAMYCIN HYDROCHLORIDE 300 MG/1
300 CAPSULE ORAL 2 TIMES DAILY
Qty: 14 CAPSULE | Refills: 0 | Status: SHIPPED | OUTPATIENT
Start: 2022-08-10 | End: 2022-08-17

## 2022-08-10 RX ORDER — PROMETHAZINE HYDROCHLORIDE 12.5 MG/1
12.5 TABLET ORAL 4 TIMES DAILY PRN
Qty: 20 TABLET | Refills: 0 | Status: SHIPPED | OUTPATIENT
Start: 2022-08-10 | End: 2022-08-17

## 2022-08-10 RX ORDER — CLINDAMYCIN HYDROCHLORIDE 150 MG/1
150 CAPSULE ORAL ONCE
Status: COMPLETED | OUTPATIENT
Start: 2022-08-10 | End: 2022-08-10

## 2022-08-10 RX ORDER — CLOVE OIL
OIL (ML) MISCELLANEOUS ONCE
Status: DISCONTINUED | OUTPATIENT
Start: 2022-08-10 | End: 2022-08-10 | Stop reason: HOSPADM

## 2022-08-10 RX ORDER — OXYCODONE HYDROCHLORIDE AND ACETAMINOPHEN 5; 325 MG/1; MG/1
1 TABLET ORAL ONCE
Status: COMPLETED | OUTPATIENT
Start: 2022-08-10 | End: 2022-08-10

## 2022-08-10 RX ORDER — LIDOCAINE HYDROCHLORIDE 20 MG/ML
JELLY TOPICAL ONCE
Status: COMPLETED | OUTPATIENT
Start: 2022-08-10 | End: 2022-08-10

## 2022-08-10 RX ORDER — OXYCODONE HYDROCHLORIDE AND ACETAMINOPHEN 5; 325 MG/1; MG/1
1 TABLET ORAL EVERY 6 HOURS PRN
Qty: 12 TABLET | Refills: 0 | Status: SHIPPED | OUTPATIENT
Start: 2022-08-10 | End: 2022-08-13

## 2022-08-10 RX ADMIN — LIDOCAINE HYDROCHLORIDE: 20 JELLY TOPICAL at 06:52

## 2022-08-10 RX ADMIN — CLINDAMYCIN HYDROCHLORIDE 150 MG: 150 CAPSULE ORAL at 06:52

## 2022-08-10 RX ADMIN — OXYCODONE AND ACETAMINOPHEN 1 TABLET: 5; 325 TABLET ORAL at 06:52

## 2022-08-10 ASSESSMENT — PAIN - FUNCTIONAL ASSESSMENT: PAIN_FUNCTIONAL_ASSESSMENT: 0-10

## 2022-08-10 ASSESSMENT — PAIN SCALES - GENERAL: PAINLEVEL_OUTOF10: 8

## 2022-08-10 NOTE — ED PROVIDER NOTES
Emergency Department Encounter    Patient: Abdoul Panchal  MRN: [id number]  : 1990  Date of Evaluation: 8/10/2022  ED Provider:  Flash Rain MD    Triage Chief Complaint:   Dental Pain (Pt states that she started having dental pain last night took tylenol twice and it hasnt helped. )    [place]:  Abdoul Panchal is a 29 y.o. female that presents weeks pregnant, severe dental pain multiple dental fractures dental caries, afebrile. No nausea vomiting or diarrhea. No rash. Afebrile    ROS - see HPI, below listed is current ROS at time of my eval:  General:  No fevers  Eyes:  no discharge  ENT:  No sore throat, no nasal congestion, no hearing changes, + dental pain  Cardiovascular:  No chest pain  Respiratory:  no cough  Gastrointestinal:  no nausea, no vomiting  Musculoskeletal:   no joint pain  Skin:  No rash  Neurologic:  No speech problems, no headache  Genitourinary:  No dysuria    Past Medical History:   Diagnosis Date    Arthritis dx 2010    Arthritis     Chlamydia     chlamydia or gonorrhea at 14years old. Depression     Eczema     Heart murmur     History of chlamydia     pt states she had either chlamydia or gonorrhea but not sure which one.  She was treated on antibiotics    History of gonorrhea     pt states she had either gonorrhea or chlamydia  and was treated on antibiotics    Latex allergy dx     allergy to latex condoms    Miscarriage     Seasonal allergies dx     Takes sudafed prn    Staph infection     Upper respiratory infection 2012    was on antibiotics and inhalor     Past Surgical History:   Procedure Laterality Date     SECTION       Family History   Problem Relation Age of Onset    Emphysema Mother     Other Mother         arthritis/endometreosis    Rashes/Skin Problems Mother         eczema    High Cholesterol Mother     Stroke Mother     Diabetes Maternal Grandmother     Thyroid Disease Maternal Grandmother     Heart Disease Maternal Grandmother Arthritis Maternal Grandmother     Asthma Maternal Grandmother      Social History     Socioeconomic History    Marital status: Single     Spouse name: Not on file    Number of children: Not on file    Years of education: Not on file    Highest education level: Not on file   Occupational History    Not on file   Tobacco Use    Smoking status: Every Day     Packs/day: 0.50     Years: 10.00     Pack years: 5.00     Types: Cigarettes    Smokeless tobacco: Never    Tobacco comments: Eufemia Fanning Vaping Use    Vaping Use: Never used   Substance and Sexual Activity    Alcohol use: Yes     Comment: occasional    Drug use: Not Currently     Frequency: 5.0 times per week     Types: Marijuana (Weed)     Comment: Stopped smoking marijuana 4-5 weeks ago    Sexual activity: Yes     Partners: Male   Other Topics Concern    Not on file   Social History Narrative    Not on file     Social Determinants of Health     Financial Resource Strain: Not on file   Food Insecurity: Not on file   Transportation Needs: Not on file   Physical Activity: Not on file   Stress: Not on file   Social Connections: Not on file   Intimate Partner Violence: Not on file   Housing Stability: Not on file     Current Facility-Administered Medications   Medication Dose Route Frequency Provider Last Rate Last Admin    clindamycin (CLEOCIN) capsule 150 mg  150 mg Oral Once Andrew Ratliff MD        clove oil liquid   Topical Once Andrew Ratliff MD        lidocaine (XYLOCAINE) 2 % jelly   Topical Once Andrew Ratliff MD        oxyCODONE-acetaminophen (PERCOCET) 5-325 MG per tablet 1 tablet  1 tablet Oral Once Andrew Ratliff MD         Current Outpatient Medications   Medication Sig Dispense Refill    clindamycin (CLEOCIN) 300 MG capsule Take 1 capsule by mouth in the morning and 1 capsule before bedtime. Do all this for 7 days.  14 capsule 0    promethazine (PHENERGAN) 12.5 MG tablet Take 1 tablet by mouth 4 times daily as needed for Nausea 20 tablet 0    oxyCODONE-acetaminophen (PERCOCET) 5-325 MG per tablet Take 1 tablet by mouth every 6 hours as needed for Pain for up to 3 days. Intended supply: 3 days. Take lowest dose possible to manage pain 12 tablet 0    acetaminophen (TYLENOL) 500 MG tablet       albuterol sulfate HFA (PROVENTIL;VENTOLIN;PROAIR) 108 (90 Base) MCG/ACT inhaler albuterol sulfate HFA 90 mcg/actuation aerosol inhaler      ibuprofen (ADVIL;MOTRIN) 400 MG tablet Take 400 mg by mouth every 8 hours as needed      naproxen (NAPROSYN) 500 MG tablet Take 1 tablet by mouth in the morning and 1 tablet before bedtime. Do all this for 20 doses. 20 tablet 0    lidocaine (LIDODERM) 5 % Place 1 patch onto the skin in the morning. 12 hours on, 12 hours off. . 30 patch 0    naproxen (NAPROSYN) 375 MG tablet Take 1 tablet by mouth 2 times daily (with meals) 60 tablet 0     Allergies   Allergen Reactions    Latex Itching     Pt states she has swelling and irritation with use of latex condoms. Vicodin [Hydrocodone-Acetaminophen] Shortness Of Breath    Flexeril [Cyclobenzaprine]      \"felt like burning\"    Bactrim Rash    Cephalexin Rash    Penicillins Rash       Nursing Notes Reviewed    Physical Exam:  Triage VS:    ED Triage Vitals [08/10/22 0641]   Enc Vitals Group      /66      Heart Rate 65      Resp 16      Temp       Temp Source Oral      SpO2 100 %      Weight       Height       Head Circumference       Peak Flow       Pain Score       Pain Loc       Pain Edu? Excl. in [address]? General appearance:  No acute distress. Skin:  Warm. Dry. Eye:  Extraocular movements intact. Ears, nose, mouth and throat:  Oral mucosa moist, no edema under the tongue, posterior pharynx without erythema, exudate or edema, slight gumline erythema noted, no abscess, dental caries noted  Neck:  Trachea midline. No tender palpable cervical lymphadenopathy  Extremity:  Normal ROM     Respiratory:  Respirations nonlabored.              Neurological: Alert and oriented      MDM:  Patient presenting for dental pain which is most likely secondary to tooth decay/dental caries. Also, we cannot exclude early periapical abscess. Clinically there is no evidence of oral abscess otherwise, no Immanuel's angina. I discussed that if a period of abscess is developing it may require drainage in the future, currently there are no drainable sites, patient has no evidence of sepsis. The patient will be given antibiotics, pain medications, and dental clinic/office list provided. I stressed the need for dental followup as emergency department treatment is not the definitive treatment of choice. Patient understands and agrees with the plan, outpatient follow up instructions given, return warnings given. Clinical Impression:  1. Pain, dental      Disposition referral (if applicable):  Dental Clinic        Disposition medications (if applicable):  New Prescriptions    CLINDAMYCIN (CLEOCIN) 300 MG CAPSULE    Take 1 capsule by mouth in the morning and 1 capsule before bedtime. Do all this for 7 days. OXYCODONE-ACETAMINOPHEN (PERCOCET) 5-325 MG PER TABLET    Take 1 tablet by mouth every 6 hours as needed for Pain for up to 3 days. Intended supply: 3 days. Take lowest dose possible to manage pain    PROMETHAZINE (PHENERGAN) 12.5 MG TABLET    Take 1 tablet by mouth 4 times daily as needed for Nausea     ED Provider Disposition Time  DISPOSITION Decision To Discharge 08/10/2022 06:45:00 AM      Comment: Please note this report has been produced using speech recognition software and may contain errors related to that system including errors in grammar, punctuation, and spelling, as well as words and phrases that may be inappropriate. Efforts were made to edit the dictations.        Mamadou Arango MD  57/53/17 [id number]

## 2022-08-18 ENCOUNTER — APPOINTMENT (OUTPATIENT)
Dept: ULTRASOUND IMAGING | Age: 32
End: 2022-08-18
Payer: MEDICAID

## 2022-08-18 ENCOUNTER — HOSPITAL ENCOUNTER (EMERGENCY)
Age: 32
Discharge: HOME | End: 2022-08-18
Payer: MEDICAID

## 2022-08-18 VITALS
WEIGHT: 138.01 LBS | SYSTOLIC BLOOD PRESSURE: 118 MMHG | HEART RATE: 91 BPM | HEIGHT: 64 IN | OXYGEN SATURATION: 100 % | DIASTOLIC BLOOD PRESSURE: 71 MMHG | TEMPERATURE: 98.8 F | RESPIRATION RATE: 16 BRPM | BODY MASS INDEX: 23.56 KG/M2

## 2022-08-18 DIAGNOSIS — R10.9 ABDOMINAL PAIN DURING PREGNANCY IN FIRST TRIMESTER: Primary | ICD-10-CM

## 2022-08-18 DIAGNOSIS — O26.891 ABDOMINAL PAIN DURING PREGNANCY IN FIRST TRIMESTER: Primary | ICD-10-CM

## 2022-08-18 LAB
A/G RATIO: 1.6 (ref 1.1–2.2)
ALBUMIN SERPL-MCNC: 4.1 G/DL (ref 3.4–5)
ALP BLD-CCNC: 57 U/L (ref 40–129)
ALT SERPL-CCNC: 21 U/L (ref 10–40)
ANION GAP SERPL CALCULATED.3IONS-SCNC: 10 MMOL/L (ref 3–16)
AST SERPL-CCNC: 15 U/L (ref 15–37)
BACTERIA WET PREP: NORMAL
BACTERIA: NORMAL /HPF
BASOPHILS ABSOLUTE: 0 K/UL (ref 0–0.2)
BASOPHILS RELATIVE PERCENT: 0.3 %
BILIRUB SERPL-MCNC: 0.3 MG/DL (ref 0–1)
BILIRUBIN URINE: NEGATIVE
BLOOD, URINE: NEGATIVE
BUN BLDV-MCNC: 6 MG/DL (ref 7–20)
C TRACH DNA GENITAL QL NAA+PROBE: NEGATIVE
CALCIUM SERPL-MCNC: 9.1 MG/DL (ref 8.3–10.6)
CHLORIDE BLD-SCNC: 105 MMOL/L (ref 99–110)
CLARITY: ABNORMAL
CLUE CELLS: NORMAL
CO2: 24 MMOL/L (ref 21–32)
COLOR: YELLOW
CREAT SERPL-MCNC: <0.5 MG/DL (ref 0.6–1.1)
EOSINOPHILS ABSOLUTE: 0.4 K/UL (ref 0–0.6)
EOSINOPHILS RELATIVE PERCENT: 4.6 %
EPITHELIAL CELLS WET PREP: NORMAL
EPITHELIAL CELLS, UA: 1 /HPF (ref 0–5)
GFR AFRICAN AMERICAN: >60
GFR NON-AFRICAN AMERICAN: >60
GLUCOSE BLD-MCNC: 74 MG/DL (ref 70–99)
GLUCOSE URINE: NEGATIVE MG/DL
GONADOTROPIN, CHORIONIC (HCG) QUANT: NORMAL MIU/ML
HCT VFR BLD CALC: 36.9 % (ref 36–48)
HEMOGLOBIN: 12.8 G/DL (ref 12–16)
HYALINE CASTS: 0 /LPF (ref 0–8)
KETONES, URINE: NEGATIVE MG/DL
LEUKOCYTE ESTERASE, URINE: NEGATIVE
LYMPHOCYTES ABSOLUTE: 1.9 K/UL (ref 1–5.1)
LYMPHOCYTES RELATIVE PERCENT: 22.1 %
MCH RBC QN AUTO: 32.1 PG (ref 26–34)
MCHC RBC AUTO-ENTMCNC: 34.6 G/DL (ref 31–36)
MCV RBC AUTO: 92.7 FL (ref 80–100)
MICROSCOPIC EXAMINATION: YES
MONOCYTES ABSOLUTE: 0.5 K/UL (ref 0–1.3)
MONOCYTES RELATIVE PERCENT: 6.2 %
N. GONORRHOEAE DNA: NEGATIVE
NEUTROPHILS ABSOLUTE: 5.7 K/UL (ref 1.7–7.7)
NEUTROPHILS RELATIVE PERCENT: 66.8 %
NITRITE, URINE: NEGATIVE
PDW BLD-RTO: 13.1 % (ref 12.4–15.4)
PH UA: 7 (ref 5–8)
PLATELET # BLD: 208 K/UL (ref 135–450)
PMV BLD AUTO: 8.4 FL (ref 5–10.5)
POTASSIUM REFLEX MAGNESIUM: 3.8 MMOL/L (ref 3.5–5.1)
PROTEIN UA: NEGATIVE MG/DL
RBC # BLD: 3.98 M/UL (ref 4–5.2)
RBC UA: 0 /HPF (ref 0–4)
RBC WET PREP: NORMAL
SODIUM BLD-SCNC: 139 MMOL/L (ref 136–145)
SOURCE WET PREP: NORMAL
SPECIFIC GRAVITY UA: 1.02 (ref 1–1.03)
TOTAL PROTEIN: 6.7 G/DL (ref 6.4–8.2)
TRICHOMONAS PREP: NORMAL
URINE REFLEX TO CULTURE: ABNORMAL
URINE TYPE: ABNORMAL
UROBILINOGEN, URINE: 0.2 E.U./DL
WBC # BLD: 8.6 K/UL (ref 4–11)
WBC UA: 0 /HPF (ref 0–5)
WBC WET PREP: NORMAL
YEAST WET PREP: NORMAL

## 2022-08-18 PROCEDURE — 76817 TRANSVAGINAL US OBSTETRIC: CPT

## 2022-08-18 PROCEDURE — 99284 EMERGENCY DEPT VISIT MOD MDM: CPT

## 2022-08-18 PROCEDURE — 85025 COMPLETE CBC W/AUTO DIFF WBC: CPT

## 2022-08-18 PROCEDURE — 87591 N.GONORRHOEAE DNA AMP PROB: CPT

## 2022-08-18 PROCEDURE — 87210 SMEAR WET MOUNT SALINE/INK: CPT

## 2022-08-18 PROCEDURE — 87491 CHLMYD TRACH DNA AMP PROBE: CPT

## 2022-08-18 PROCEDURE — 80053 COMPREHEN METABOLIC PANEL: CPT

## 2022-08-18 PROCEDURE — 6370000000 HC RX 637 (ALT 250 FOR IP): Performed by: GENERAL ACUTE CARE HOSPITAL

## 2022-08-18 PROCEDURE — 81001 URINALYSIS AUTO W/SCOPE: CPT

## 2022-08-18 PROCEDURE — 84702 CHORIONIC GONADOTROPIN TEST: CPT

## 2022-08-18 RX ORDER — ACETAMINOPHEN 500 MG
1000 TABLET ORAL ONCE
Status: COMPLETED | OUTPATIENT
Start: 2022-08-18 | End: 2022-08-18

## 2022-08-18 RX ADMIN — ACETAMINOPHEN 1000 MG: 500 TABLET ORAL at 12:15

## 2022-08-18 ASSESSMENT — PAIN DESCRIPTION - DESCRIPTORS: DESCRIPTORS: CRAMPING

## 2022-08-18 ASSESSMENT — ENCOUNTER SYMPTOMS
ABDOMINAL PAIN: 1
SORE THROAT: 0
BLOOD IN STOOL: 0
WHEEZING: 0
CHEST TIGHTNESS: 0
NAUSEA: 0
SHORTNESS OF BREATH: 0
VOMITING: 0
BACK PAIN: 0

## 2022-08-18 ASSESSMENT — PAIN SCALES - GENERAL
PAINLEVEL_OUTOF10: 5
PAINLEVEL_OUTOF10: 3

## 2022-08-18 ASSESSMENT — PAIN - FUNCTIONAL ASSESSMENT: PAIN_FUNCTIONAL_ASSESSMENT: 0-10

## 2022-08-18 ASSESSMENT — PAIN DESCRIPTION - FREQUENCY: FREQUENCY: INTERMITTENT

## 2022-08-18 ASSESSMENT — PAIN DESCRIPTION - LOCATION: LOCATION: ABDOMEN

## 2022-08-18 NOTE — ED TRIAGE NOTES
Pt arrives ambulatory for eval of abdominal pains at 8 weeks pregnant. Pt sts 2 miscarriages, and one stillborn, no live births. Pt is a/ox4, rsp nonlabored and pwd.

## 2022-08-18 NOTE — ED PROVIDER NOTES
naproxen (NAPROSYN) 375 MG tablet Take 1 tablet by mouth 2 times daily (with meals), Disp-60 tablet, R-0Print               ALLERGIES     Latex, Vicodin [hydrocodone-acetaminophen], Flexeril [cyclobenzaprine], Bactrim, Cephalexin, and Penicillins    FAMILYHISTORY       Family History   Problem Relation Age of Onset    Emphysema Mother     Other Mother         arthritis/endometreosis    Rashes/Skin Problems Mother         eczema    High Cholesterol Mother     Stroke Mother     Diabetes Maternal Grandmother     Thyroid Disease Maternal Grandmother     Heart Disease Maternal Grandmother     Arthritis Maternal Grandmother     Asthma Maternal Grandmother           SOCIAL HISTORY       Social History     Tobacco Use    Smoking status: Every Day     Packs/day: 0.50     Years: 10.00     Pack years: 5.00     Types: Cigarettes    Smokeless tobacco: Never    Tobacco comments: Lawernce Roughen Vaping Use    Vaping Use: Never used   Substance Use Topics    Alcohol use: Not Currently     Comment: occasional    Drug use: Not Currently     Frequency: 5.0 times per week     Types: Marijuana Pan Shaheed)     Comment: Stopped smoking marijuana 4-5 weeks ago       SCREENINGS    [place] Coma Scale  Eye Opening: Spontaneous  Best Verbal Response: Oriented  Best Motor Response: Obeys commands  Germaine Coma Scale Score: 15        PHYSICAL EXAM    (up to 7 for level 4, 8 or more for level 5)     ED Triage Vitals [08/18/22 1128]   BP Temp Temp Source Heart Rate Resp SpO2 Height Weight   118/71 98.8 °F (37.1 °C) Oral 91 16 100 % 5' 4\" (1.626 m) 138 lb 0.1 oz (62.6 kg)       Physical Exam  Vitals and nursing note reviewed. Constitutional:       General: She is not in acute distress. Appearance: Normal appearance. She is well-developed. She is not ill-appearing. HENT:      Head: Normocephalic and atraumatic.       Right Ear: External ear normal.      Left Ear: External ear normal.      Nose: Nose normal.      Mouth/Throat:      Mouth: Mucous membranes are moist.      Pharynx: Oropharynx is clear. Eyes:      General:         Right eye: No discharge. Left eye: No discharge. Extraocular Movements: Extraocular movements intact. Pupils: Pupils are equal, round, and reactive to light. Cardiovascular:      Rate and Rhythm: Normal rate and regular rhythm. Heart sounds: Normal heart sounds. Pulmonary:      Effort: Pulmonary effort is normal. No respiratory distress. Breath sounds: Normal breath sounds. Abdominal:      General: Abdomen is flat. Bowel sounds are normal.      Palpations: Abdomen is soft. Tenderness: There is no abdominal tenderness. There is no right CVA tenderness or left CVA tenderness. Musculoskeletal:      Cervical back: Normal range of motion and neck supple. Skin:     General: Skin is warm and dry. Capillary Refill: Capillary refill takes less than 2 seconds. Neurological:      General: No focal deficit present. Mental Status: She is alert and oriented to person, place, and time. Psychiatric:         Mood and Affect: Mood is anxious. Behavior: Behavior normal.       DIAGNOSTIC RESULTS   LABS:    Labs Reviewed   URINALYSIS WITH REFLEX TO CULTURE - Abnormal; Notable for the following components:       Result Value    Clarity, UA CLOUDY (*)     All other components within normal limits   CBC WITH AUTO DIFFERENTIAL - Abnormal; Notable for the following components:    RBC 3.98 (*)     All other components within normal limits   COMPREHENSIVE METABOLIC PANEL W/ REFLEX TO MG FOR LOW K - Abnormal; Notable for the following components:    BUN 6 (*)     Creatinine <0.5 (*)     All other components within normal limits   WET PREP, GENITAL   C.TRACHOMATIS N.GONORRHOEAE DNA   HCG, QUANTITATIVE, PREGNANCY   MICROSCOPIC URINALYSIS       When ordered only abnormal lab results are displayed. All other labs were within normal range or not returned as of this dictation. EKG:  When ordered, EKG's are interpreted by the Emergency Department Physician in the absence of a cardiologist.  Please see their note for interpretation of EKG. RADIOLOGY:   Non-plain film images such as CT, Ultrasound and MRI are read by the radiologist. Plain radiographic images are visualized and preliminarily interpreted by the ED Provider with the below findings:        Interpretation per the Radiologist below, if available at the time of this note:    US OB TRANSVAGINAL   Final Result   1. Single live intrauterine pregnancy measuring 7 weeks 6 days. 2. Small subchorionic hemorrhage. 3. Normal appearance of the left ovary with normal Doppler flow. Nonvisualization of the right ovary. US OB TRANSVAGINAL    Result Date: 8/18/2022  EXAMINATION: FIRST TRIMESTER OBSTETRIC ULTRASOUND 8/18/2022 TECHNIQUE: Transvaginal first trimester obstetric pelvic ultrasound was performed with color Doppler flow evaluation. COMPARISON: 02/28/2012 HISTORY: ORDERING SYSTEM PROVIDED HISTORY: Pregnant and Pelvic Pain TECHNOLOGIST PROVIDED HISTORY: Reason for exam:->Pregnant and Pelvic Pain LMP 06/15/2022 FINDINGS: Uterus: 10.7 x 6.8 x 5.7 cm Gestational Sac(s):  A single intrauterine gestational sac is seen with a small subchorionic hemorrhage. Yolk Sac:  Present Fetal Pole:  Single fetal pole Crown Rump Length:  1.5 cm Fetal Heart Rate:  167 beats per minute Right ovary: Not visualized Left ovary: 3.9 x 2.2 x 1.9 cm. Normal arterial and venous Doppler flow. Free fluid: None Measurements: Estimated gestational age by current ultrasound: 7 weeks 6 days Estimated gestational by LMP/prior ultrasound: 9 weeks 1 day Estimated Due Date: 03/31/2023     1. Single live intrauterine pregnancy measuring 7 weeks 6 days. 2. Small subchorionic hemorrhage. 3. Normal appearance of the left ovary with normal Doppler flow. Nonvisualization of the right ovary.            PROCEDURES   Unless otherwise noted below, none     Procedures    CRITICAL CARE TIME   none    CONSULTS:  None      EMERGENCY DEPARTMENT COURSE and DIFFERENTIAL DIAGNOSIS/MDM:   Vitals:    Vitals:    08/18/22 1128   BP: 118/71   Pulse: 91   Resp: 16   Temp: 98.8 °F (37.1 °C)   TempSrc: Oral   SpO2: 100%   Weight: 138 lb 0.1 oz (62.6 kg)   Height: 5' 4\" (1.626 m)       Patient was given the following medications:  Medications   acetaminophen (TYLENOL) tablet 1,000 mg (1,000 mg Oral Given 8/18/22 1215)         Is this patient to be included in the SEP-1 Core Measure due to severe sepsis or septic shock? No   Exclusion criteria - the patient is NOT to be included for SEP-1 Core Measure due to: Infection is not suspected    Previous records reviewed in order to gain further information regarding patient's PMH is well as her HPI. Nursing notes reviewed. This is a 74-year-old female who is G4, P0 approximately 8 weeks pregnant who presents to the emergency department today reporting intermittent lower abdominal cramping. Physical exam complete. Patient is nontoxic, afebrile, normotensive. Her abdomen is soft and nonsurgical.  Urinalysis is negative for infection. Laboratory studies have been unremarkable. Transvaginal ultrasound interpreted by radiologist shows a single live intrauterine pregnancy estimated gestational age of 9 weeks 6 days. A small subchorionic hemorrhage is noted, see above report for full details. Patient reassessed. She has remained hemodynamically stable throughout ED visit. At this time there is no evidence of any life-threatening or emergent conditions requiring immediate intervention. Low suspicion for sepsis, pyelonephritis, ectopic pregnancy, or other acute intra-abdominal pathology. Patient will be discharged with emphasis on close outpatient follow-up. She is scheduled to see her OB/GYN specialist on Tuesday. She agrees to keep this appointment. She is encouraged to increase her fluid intake.   She is advised that she may take OTC Tylenol for any abdominal cramping. She agrees to the plan to nearest ED for high fever, incessant vomiting, severe pain, vaginal bleeding, any other worsening symptoms. Patient is discharged home in stable condition. FINAL IMPRESSION      1.  Abdominal pain during pregnancy in first trimester          DISPOSITION/PLAN   DISPOSITION Decision To Discharge 08/18/2022 02:30:06 PM      PATIENT REFERRED TO:  Your OBGYN appointment on Tuesday          DISCHARGE MEDICATIONS:  Discharge Medication List as of 8/18/2022  3:00 PM          DISCONTINUED MEDICATIONS:  Discharge Medication List as of 8/18/2022  3:00 PM                 (Please note that portions of this note were completed with a voice recognition program.  Efforts were made to edit the dictations but occasionally words are mis-transcribed.)    MINO Love CNP (electronically signed)            MINO Love CNP  08/18/22 4806

## 2022-08-18 NOTE — PROCEDURES
Will call for patient when either urine or blood work comes back positive for pregnancy, as per protocol. Thank you.

## 2023-03-11 LAB
ABO GROUPING: NORMAL
ANTIBODY SCREEN: NEGATIVE
HCT VFR BLD CALC: 38.3 % (ref 35–45)
HEMOGLOBIN: 12.9 G/DL (ref 11.7–15.5)
MCH RBC QN AUTO: 30.5 PG (ref 27–33)
MCHC RBC AUTO-ENTMCNC: 33.5 G/DL (ref 32–36)
MCV RBC AUTO: 91 FL (ref 80–100)
PDW BLD-RTO: 14 % (ref 11–15)
PLATELET # BLD: 242 10E3/UL (ref 140–400)
PMV BLD AUTO: 9.5 FL (ref 7.5–11.5)
RBC # BLD: 4.21 10E6/UL (ref 3.8–5.1)
RH FACTOR: POSITIVE
WBC # BLD: 15.1 10E3/UL (ref 3.8–10.8)

## 2023-11-01 ENCOUNTER — HOSPITAL ENCOUNTER (OUTPATIENT)
Dept: PHYSICAL THERAPY | Age: 33
Setting detail: THERAPIES SERIES
Discharge: HOME | End: 2023-11-01
Payer: COMMERCIAL

## 2023-11-01 DIAGNOSIS — R32 URINARY INCONTINENCE IN FEMALE: ICD-10-CM

## 2023-11-01 DIAGNOSIS — R10.2 PELVIC PAIN IN FEMALE: Primary | ICD-10-CM

## 2023-11-01 DIAGNOSIS — M54.9 BACK PAIN AFFECTING PREGNANCY, ANTEPARTUM: ICD-10-CM

## 2023-11-01 DIAGNOSIS — O99.891 BACK PAIN AFFECTING PREGNANCY, ANTEPARTUM: ICD-10-CM

## 2023-11-01 PROCEDURE — 97161 PT EVAL LOW COMPLEX 20 MIN: CPT | Performed by: SPECIALIST

## 2023-11-01 PROCEDURE — 97530 THERAPEUTIC ACTIVITIES: CPT | Performed by: SPECIALIST

## 2023-11-01 NOTE — PLAN OF CARE
decreased ROM, strength and function  [x]signs/symptoms consistent with other: post partum lower back pain      Prognosis/Rehab Potential:      [x]Excellent   []Good    []Fair   []Poor    Tolerance of evaluation/treatment:    [x]Excellent   []Good    []Fair   []Poor    Physical Therapy Evaluation Complexity Justification  [x] A history of present problem with:  [] no personal factors and/or comorbidities that impact the plan of care;  [x]1-2 personal factors and/or comorbidities that impact the plan of care  []3 personal factors and/or comorbidities that impact the plan of care  [x] An examination of body systems using standardized tests and measures addressing any of the following: body structures and functions (impairments), activity limitations, and/or participation restrictions;:  [] a total of 1-2 or more elements   [x] a total of 3 or more elements   [] a total of 4 or more elements   [x] A clinical presentation with:  [x] stable and/or uncomplicated characteristics   [] evolving clinical presentation with changing characteristics  [] unstable and unpredictable characteristics;   [x] Clinical decision making of [x] low, [] moderate, [] high complexity using standardized patient assessment instrument and/or measurable assessment of functional outcome.     [x] EVAL (LOW) 98857 (typically 20 minutes face-to-face)  [] EVAL (MOD) 23081 (typically 30 minutes face-to-face)  [] EVAL (HIGH) 40789 (typically 45 minutes face-to-face)  [] RE-EVAL       PLAN:   Frequency/Duration:  2 days per week for 6 Weeks:  Interventions:  [x]  Therapeutic exercise including: strength training, ROM, and functional mobility for joint, spine, core, and pelvic floor   [x]  NMR activation and proprioception for abdominals, pelvic floor musculature activation and coordination, and posture retraining   [x]  Manual therapy as indicated for spine, ribs, soft tissue, and pelvic floor to include: IASTM with or without dilator, STM, PROM, Gr I-IV

## 2023-11-01 NOTE — FLOWSHEET NOTE
[facility]  Phone: [phone]   Fax: [phone]    Physical Therapy Daily Treatment Note    Date:  2023     Patient Name:  Solo Coyle    :  1990  MRN: [id number]  Medical Diagnosis:  Pelvic pain [R10.2]  Back pain [M54.9]  Treatment Diagnosis: lower back pain, pelvic pain, UI  Insurance/Certification information:   [facility]  Physician Information:  Doris Chavarria MD    Plan of care signed (Y/N): []  Yes [x]  No     Date of Patient follow up with Physician:      Progress Report: []  Yes  [x]  No     Date Range for reporting period:  Beginnin2023  Ending:     Progress report due (10 Rx/or 30 days whichever is less): visit #10 or  (date)     Recertification due (POC duration/ or 90 days whichever is less): visit # or  (date)     Visit # Insurance Allowable Auth required?  Date Range   ; pending pending []  Yes  []  No        Units approved Units used Date Range          Latex Allergy:  [x]NO      []YES  Preferred Language for Healthcare:   [x]English       []other:      Functional Outcome: PFDI-20 Score: 16.67  Sub-sections:  POPDI-6 Score : 0;  CRADI-8 Score : 0;  LILIA-6 Score : 16.67    JOON: 19/50, 48% on     Pain level:  7/10     SUBJECTIVE:  See eval    OBJECTIVE: See eval      RESTRICTIONS/PRECAUTIONS:     Exercises/Interventions:     Therapeutic Exercises (57305) Resistance / level Sets/sec Reps Notes                                                                  Therapeutic Activities (90487)                                          Neuromuscular Re-ed (26461)                                                 Manual Intervention (31116)                                                     Modalities:     Pt. Education:  2023: patient educated on diagnosis, prognosis and expectations for rehab, all patient questions were answered  Home Exercise Program:  Deferred to next visit      Therapeutic Exercise and

## 2023-11-06 ENCOUNTER — HOSPITAL ENCOUNTER (OUTPATIENT)
Dept: PHYSICAL THERAPY | Age: 33
Setting detail: THERAPIES SERIES
Discharge: HOME | End: 2023-11-06
Payer: COMMERCIAL

## 2023-11-06 PROCEDURE — 97140 MANUAL THERAPY 1/> REGIONS: CPT

## 2023-11-06 PROCEDURE — 97110 THERAPEUTIC EXERCISES: CPT

## 2023-11-06 NOTE — FLOWSHEET NOTE
Limitations/Impairments:  []Sleeping []Sitting               []Standing []Transfers        []Walking []Kneeling               []Stairs []Squatting / bending   []ADLs []Reaching  []Lifting  []Housework  []Driving []Job related tasks  []Sports/Recreation []Other:        ASSESSMENT:  Pt reported some pain in her hips during therex focused on glute med strength. Responded favorably to manual of pelvis, sacrum and spine, however stated she would be sore alter. Advised ice and adding back support to her seat when she is rocking her son to sleep. Plan to continue with increasing spinal stability, alignment and gaining strength. Treatment/Activity Tolerance:  [] Patient able to complete tx [] Patient limited by fatigue  [] Patient limited by pain  [] Patient limited by other medical complications  [] Other:     Prognosis: [] Good [] Fair  [] Poor    Patient Requires Follow-up: [x] Yes  [] No    Plan for next treatment session:    PLAN: See eval. PT 2x / week for 6 weeks pending auth  [x] Continue per plan of care [] Alter current plan (see comments)  [] Plan of care initiated [] Hold pending MD visit [] Discharge    Electronically signed by: Nubia Valerio, PT, DPT    Note: If patient does not return for scheduled/ recommended follow up visits, this note will serve as a discharge from care along with most recent update on progress.

## 2023-11-09 ENCOUNTER — HOSPITAL ENCOUNTER (OUTPATIENT)
Dept: PHYSICAL THERAPY | Age: 33
Setting detail: THERAPIES SERIES
Discharge: HOME | End: 2023-11-09
Payer: COMMERCIAL

## 2023-11-09 PROCEDURE — 97140 MANUAL THERAPY 1/> REGIONS: CPT

## 2023-11-09 PROCEDURE — 97110 THERAPEUTIC EXERCISES: CPT

## 2023-11-09 NOTE — FLOWSHEET NOTE
X10   X15 B 11/9                        Therapeutic Activities (77138)                                          Neuromuscular Re-ed (14213)                                                 Manual Intervention (12232)       Assessed pelvic position - MET to improve L post and R ant innominates with shotgun     Assessed sacral position -PA mobs to L sacral base and centrally at sacral base    PA mobs grade 3 through mid to low lumb spine       Assessed pelvic position - long leg distraction on L to improve upslip     Assessed sacral position - PA mobs to R sacral base and centrally, MWM into lumb flexion at sacral base grade 2-3  X 5 min          X 5 min                                     Modalities:     Pt. Education:  11/6: discussed setting up chair with back support and pillow behind her to help support her back when rocking her son to sleep     11/1/2023: patient educated on diagnosis, prognosis and expectations for rehab, all patient questions were answered      Home Exercise Program:  Access Code: [email or web address]  URL: [email or web address]  Date: 11/06/2023  Prepared by: Kristie Fitting    Exercises  - Supine Transversus Abdominis Bracing - Hands on Stomach  - 1 x daily - 7 x weekly - 3 sets - 10 reps  - Supine March with Posterior Pelvic Tilt  - 1 x daily - 7 x weekly - 3 sets - 10 reps  - Supine Bridge  - 1 x daily - 7 x weekly - 3 sets - 10 reps  - Lying Prone  - 1 x daily - 7 x weekly - 3 sets - 10 reps      Therapeutic Exercise and NMR EXR  [x] (05307) Provided verbal/tactile cueing for activities related to strengthening, flexibility, endurance, ROM for improvements in  [x] PF/ LE / Lumbar: LE, proximal hip, and core control with self care, mobility, lifting, ambulation.   [] UE / Cervical: cervical, postural, scapular, scapulothoracic and UE control with self care, reaching, carrying, lifting, house/yardwork, driving, computer work.  [] (05159) Provided verbal/tactile cueing for activities related to

## 2023-11-15 ENCOUNTER — HOSPITAL ENCOUNTER (OUTPATIENT)
Dept: PHYSICAL THERAPY | Age: 33
Setting detail: THERAPIES SERIES
Discharge: HOME | End: 2023-11-15
Payer: COMMERCIAL

## 2023-11-15 PROCEDURE — 97530 THERAPEUTIC ACTIVITIES: CPT | Performed by: SPECIALIST

## 2023-11-15 PROCEDURE — 97110 THERAPEUTIC EXERCISES: CPT | Performed by: SPECIALIST

## 2023-11-15 NOTE — FLOWSHEET NOTE
muscles ([id number])  [] Group:      [] Other:       GOALS:  Patient stated goal: \"get in better shape, be able to take care of infant without pain\"  [] Progressing: [] Met: [] Not Met: [] Adjusted      Therapist goals for Patient:   Short Term Goals: To be achieved in: 2 weeks  1. Independent in HEP and progression per patient tolerance, in order to prevent future occurrence of presenting issue. [] Progressing: [] Met: [] Not Met: [] Adjusted  2. Patient will have a decrease in pain/urination to indicate improvement in pelvic floor strength and relaxation, muscle coordination, and/or bladder retraining. [] Progressing: [] Met: [] Not Met: [] Adjusted    Long Term Goals: To be achieved in: 6 weeks  1. Disability index score of 10% or less on the PFDI-20 and JOON too 15% or less to assist with reaching prior level of function. [] Progressing: [] Met: [] Not Met: [] Adjusted  2. Patient will report ability to tolerate penetration without increase in pain to progress towards intercourse / examinations without pain or limitation. [] Progressing: [] Met: [] Not Met: [] Adjusted  3. Patient will report 1 day or greater without need for urinary pad to progress towards completing ADLs and recreational activities without leakage. [] Progressing: [] Met: [] Not Met: [] Adjusted  4. Patient will return to functional activities including duties related to  without increased symptoms or restriction. [] Progressing: [] Met: [] Not Met: [] Adjusted    Overall Progression Towards Functional goals/ Treatment Progress Update:  [] Patient is progressing as expected towards functional goals listed. [] Progression is slowed due to complexities/Impairments listed. [] Progression has been slowed due to co-morbidities.   [x] Plan just implemented, too soon to assess goals progression <30days   [] Goals require adjustment due to lack of progress  [] Patient is not progressing as expected and requires additional follow up

## 2023-11-17 ENCOUNTER — HOSPITAL ENCOUNTER (OUTPATIENT)
Dept: PHYSICAL THERAPY | Age: 33
Setting detail: THERAPIES SERIES
Discharge: HOME | End: 2023-11-17
Payer: COMMERCIAL

## 2023-11-17 PROCEDURE — 97110 THERAPEUTIC EXERCISES: CPT

## 2023-11-17 PROCEDURE — 97140 MANUAL THERAPY 1/> REGIONS: CPT

## 2023-11-17 NOTE — FLOWSHEET NOTE
implemented, too soon to assess goals progression <30days   [] Goals require adjustment due to lack of progress  [] Patient is not progressing as expected and requires additional follow up with physician  [] Other    Persisting Functional Limitations/Impairments:  []Sleeping []Sitting               []Standing []Transfers        []Walking []Kneeling               []Stairs []Squatting / bending   []ADLs []Reaching  []Lifting  []Housework  []Driving []Job related tasks  []Sports/Recreation []Other:        ASSESSMENT: Pt's back pain elevated today, states she is stressed and started a job Monday and has been worse ever since. Focused on pain relieving techniques and manual to pelvis and sacrum. Will monitor response. Plan to continue with endurance and core strength to support back and pelvic floor. Treatment/Activity Tolerance:  [x] Patient able to complete tx [] Patient limited by fatigue  [] Patient limited by pain  [] Patient limited by other medical complications  [] Other:     Prognosis: [x] Good [] Fair  [] Poor    Patient Requires Follow-up: [x] Yes  [] No    Plan for next treatment session:    PLAN: See eval. PT 2x / week for 6 weeks pending auth  [x] Continue per plan of care [] Alter current plan (see comments)  [] Plan of care initiated [] Hold pending MD visit [] Discharge    Electronically signed by: Taryn Aguilera PT, DPT    Note: If patient does not return for scheduled/ recommended follow up visits, this note will serve as a discharge from care along with most recent update on progress.

## 2023-11-20 ENCOUNTER — HOSPITAL ENCOUNTER (OUTPATIENT)
Dept: PHYSICAL THERAPY | Age: 33
Setting detail: THERAPIES SERIES
Discharge: HOME | End: 2023-11-20
Payer: COMMERCIAL

## 2023-11-20 PROCEDURE — 97110 THERAPEUTIC EXERCISES: CPT

## 2023-11-20 NOTE — FLOWSHEET NOTE
co-morbidities. [x] Plan just implemented, too soon to assess goals progression <30days   [] Goals require adjustment due to lack of progress  [] Patient is not progressing as expected and requires additional follow up with physician  [] Other    Persisting Functional Limitations/Impairments:  []Sleeping []Sitting               []Standing []Transfers        []Walking []Kneeling               []Stairs []Squatting / bending   []ADLs []Reaching  []Lifting  []Housework  []Driving []Job related tasks  []Sports/Recreation []Other:        ASSESSMENT: Pt's back pain improved since last week. Got a new job and has been a little less stressed lately. Challenged with abdominal strengthening and reported she really enjoyed the new resistance training added today. Plan to continue with strengthening, endurance, and core control and function to reach all goals. Treatment/Activity Tolerance:  [x] Patient able to complete tx [] Patient limited by fatigue  [] Patient limited by pain  [] Patient limited by other medical complications  [] Other:     Prognosis: [x] Good [] Fair  [] Poor    Patient Requires Follow-up: [x] Yes  [] No    Plan for next treatment session:    PLAN: See eval. PT 2x / week for 6 weeks pending auth  [x] Continue per plan of care [] Alter current plan (see comments)  [] Plan of care initiated [] Hold pending MD visit [] Discharge    Electronically signed by: Maria Eugenia Huddleston, PT, DPT    Note: If patient does not return for scheduled/ recommended follow up visits, this note will serve as a discharge from care along with most recent update on progress.

## 2023-11-24 ENCOUNTER — HOSPITAL ENCOUNTER (OUTPATIENT)
Dept: PHYSICAL THERAPY | Age: 33
Setting detail: THERAPIES SERIES
Discharge: HOME | End: 2023-11-24
Payer: COMMERCIAL

## 2023-11-24 NOTE — FLOWSHEET NOTE
[facility]     Physical Therapy  Cancellation/No-show Note  Patient Name:  Daniele Reese  :  1990   Date:  2023  Cancelled visits to date: 0  No-shows to date: 1 ()    Patient status for today's appointment patient:  []  Cancelled  []  Rescheduled appointment  [x]  No-show     Reason given by patient:  []  Patient ill  []  Conflicting appointment  []  No transportation    []  Conflict with work  [x]  No reason given  []  Other:     Comments:      Phone call information:   []  Phone call made today to patient at _ time at number provided:      []  Patient answered, conversation as follows:    []  Patient did not answer, message left as follows:  [x]  Phone call not made today  []  Phone call not needed - pt contacted us to cancel and provided reason for cancellation.      Electronically signed by:  Venkatesh Andre PT

## 2023-11-28 ENCOUNTER — APPOINTMENT (OUTPATIENT)
Dept: PHYSICAL THERAPY | Age: 33
End: 2023-11-28
Payer: COMMERCIAL

## 2023-11-30 ENCOUNTER — APPOINTMENT (OUTPATIENT)
Dept: PHYSICAL THERAPY | Age: 33
End: 2023-11-30
Payer: COMMERCIAL

## 2023-12-04 ENCOUNTER — HOSPITAL ENCOUNTER (OUTPATIENT)
Dept: PHYSICAL THERAPY | Age: 33
Setting detail: THERAPIES SERIES
Discharge: HOME | End: 2023-12-04

## 2023-12-04 NOTE — FLOWSHEET NOTE
[facility]     Physical Therapy  Cancellation/No-show Note  Patient Name:  Mary Ellen Cr  :  1990   Date:  2023  Cancelled visits to date: 1 ()  No-shows to date: 1 ()    Patient status for today's appointment patient:  [x]  Cancelled  []  Rescheduled appointment  []  No-show     Reason given by patient:  []  Patient ill  []  Conflicting appointment  [x]  No transportation    []  Conflict with work  []  No reason given  []  Other:     Comments:      Phone call information:   []  Phone call made today to patient at _ time at number provided:      []  Patient answered, conversation as follows:    []  Patient did not answer, message left as follows:  []  Phone call not made today  [x]  Phone call not needed - pt contacted us to cancel and provided reason for cancellation.      Electronically signed by:  Anna Deal PT

## 2023-12-08 ENCOUNTER — APPOINTMENT (OUTPATIENT)
Dept: PHYSICAL THERAPY | Age: 33
End: 2023-12-08
Payer: COMMERCIAL

## 2023-12-11 ENCOUNTER — HOSPITAL ENCOUNTER (OUTPATIENT)
Dept: PHYSICAL THERAPY | Age: 33
Setting detail: THERAPIES SERIES
Discharge: HOME | End: 2023-12-11
Payer: COMMERCIAL

## 2023-12-11 PROCEDURE — 97110 THERAPEUTIC EXERCISES: CPT | Performed by: SPECIALIST

## 2023-12-11 NOTE — FLOWSHEET NOTE
self-care/home management training related to activities of daily living and compensatory training, and/or use of adaptive equipment for improvement with: ADLs and compensatory training, meal preparation, safety procedures and instruction in use of adaptive equipment, including bathing, grooming, dressing, personal hygiene, basic household cleaning and chores. Home Exercise Program:    [] (56745) Reviewed/Progressed HEP activities related to strengthening, flexibility, endurance, ROM of   [] PF/ LE / Lumbar: core, proximal hip and LE for functional self-care, mobility, lifting and ambulation/stair navigation   [] UE / Cervical: cervical, postural, scapular, scapulothoracic and UE control with self care, reaching, carrying, lifting, house/yardwork, driving, computer work  [] (89730)Reviewed/Progressed HEP activities related to improving balance, coordination, kinesthetic sense, posture, motor skill, proprioception of   [] PF / LE: core, proximal hip and LE for self care, mobility, lifting, and ambulation/stair navigation    [] UE / Cervical: cervical, postural,  scapular, scapulothoracic and UE control with self care, reaching, carrying, lifting, house/yardwork, driving, computer work    Manual Treatments:  PROM / STM / Oscillations-Mobs:  G-I, II, III, IV (PA's, Inf., Post.)  [] (39148) Provided manual therapy to mobilize LE, proximal hip and/or LS spine soft tissue/joints for the purpose of modulating pain, promoting relaxation,  increasing ROM, reducing/eliminating soft tissue swelling/inflammation/restriction, improving soft tissue extensibility and allowing for proper ROM for normal function with   [] PF/ LE / lumbar: self care, mobility, lifting and ambulation. [] UE / Cervical: self care, reaching, carrying, lifting, house/yardwork, driving, computer work.        Charges:  Timed Code Treatment Minutes: 42   Total Treatment Minutes: 42     [] EVAL - LOW (91414)   [] EVAL - MOD (90548)  [] EVAL - HIGH

## 2024-02-22 LAB
ABO GROUPING: NORMAL
ALT SERPL-CCNC: 22 IU/L (ref 10–60)
ANTIBODY SCREEN: NEGATIVE
AST SERPL-CCNC: 17 IU/L (ref 10–40)
BASOPHILS ABSOLUTE: 0 %
BASOPHILS ABSOLUTE: 0 THOU/MCL (ref 0–0.2)
BILIRUB SERPL-MCNC: 0.4 MG/DL (ref 0–1.2)
BUN BLDV-MCNC: 9 MG/DL (ref 8–26)
CHLAMYDIA TRACHOMATIS AMPLIFIED DET: NOT DETECTED
CREAT SERPL-MCNC: 0.58 MG/DL (ref 0.6–1.2)
CREATININE URINE: NORMAL MG/DL
EGFR (CKD-EPI): 123 ML/MIN/1.73 M2
EOSINOPHILS ABSOLUTE: 0.6 THOU/MCL (ref 0.03–0.45)
EOSINOPHILS RELATIVE PERCENT: 6 %
ESTIMATED AVERAGE GLUCOSE: 105 MG/DL
HB: SOURCE: NORMAL
HBA1C MFR BLD: 5.3 % (ref 4.2–5.6)
HCT VFR BLD CALC: 41.8 % (ref 36–46)
HEMOGLOBIN: 13.5 G/DL (ref 12–15.2)
HEPATITIS B SURF AG,XHBAGS: NONREACTIVE
HEPATITIS C VIRUS RNA SER/PLAS NCNC: NONREACTIVE
HIV 1+2 AB+HIV1P24 AG, EIA: NONREACTIVE
LACTATE DEHYDROGENASE: 162 IU/L (ref 100–271)
LYMPHOCYTES ABSOLUTE: 2.4 THOU/MCL (ref 1–4)
LYMPHOCYTES RELATIVE PERCENT: 23 %
MCH RBC QN AUTO: 28.7 PG (ref 27–33)
MCHC RBC AUTO-ENTMCNC: 32.3 G/DL (ref 32–36)
MCV RBC AUTO: 88.9 FL (ref 82–97)
MONOCYTES # BLD: 6 %
MONOCYTES ABSOLUTE: 0.6 THOU/MCL (ref 0.2–0.9)
N GONORRHOEAE AMPLIFIED DET: NOT DETECTED
NEUTROPHILS ABSOLUTE: 6.7 THOU/MCL (ref 1.8–7.7)
PDW BLD-RTO: 14.7 % (ref 12.3–17)
PLATELET # BLD: 237 THOU/MCL (ref 140–375)
PMV BLD AUTO: 9.1 FL (ref 7.4–11.5)
PROTEIN 24 HOUR URINE: NORMAL MG/DL (ref 0–9.9)
RBC # BLD: 4.71 MIL/MCL (ref 3.8–5.2)
RUBELLA ANTIBODY IGG: 1.03 INDEX
SEG NEUTROPHILS: 65 %
SPECIMEN TYPE: NORMAL
T PALLIDUM AB: 0.04 INDEX VALUE
TSH ULTRASENSITIVE: 1.02 MCIU/ML (ref 0.27–4.2)
URIC ACID, SERUM: 4.2 MG/DL (ref 2.3–6.6)
URINE TOTAL PROTEIN CREATININE RATIO: NORMAL RATIO
WBC # BLD: 10.5 THOU/MCL (ref 3.6–10.5)